# Patient Record
Sex: MALE | Race: WHITE | NOT HISPANIC OR LATINO | URBAN - METROPOLITAN AREA
[De-identification: names, ages, dates, MRNs, and addresses within clinical notes are randomized per-mention and may not be internally consistent; named-entity substitution may affect disease eponyms.]

---

## 2018-07-27 PROBLEM — Z00.00 ENCOUNTER FOR PREVENTIVE HEALTH EXAMINATION: Status: ACTIVE | Noted: 2018-07-27

## 2018-07-28 ENCOUNTER — OUTPATIENT (OUTPATIENT)
Dept: OUTPATIENT SERVICES | Facility: HOSPITAL | Age: 83
LOS: 1 days | End: 2018-07-28
Payer: MEDICARE

## 2018-07-28 ENCOUNTER — APPOINTMENT (OUTPATIENT)
Dept: MRI IMAGING | Facility: IMAGING CENTER | Age: 83
End: 2018-07-28
Payer: MEDICARE

## 2018-07-28 DIAGNOSIS — Z00.8 ENCOUNTER FOR OTHER GENERAL EXAMINATION: ICD-10-CM

## 2018-07-28 PROCEDURE — A9585: CPT

## 2018-07-28 PROCEDURE — 70551 MRI BRAIN STEM W/O DYE: CPT

## 2018-07-28 PROCEDURE — 70549 MR ANGIOGRAPH NECK W/O&W/DYE: CPT

## 2018-07-28 PROCEDURE — 70551 MRI BRAIN STEM W/O DYE: CPT | Mod: 26

## 2018-07-28 PROCEDURE — 70549 MR ANGIOGRAPH NECK W/O&W/DYE: CPT | Mod: 26

## 2018-07-28 PROCEDURE — 82565 ASSAY OF CREATININE: CPT

## 2018-08-06 ENCOUNTER — APPOINTMENT (OUTPATIENT)
Dept: MRI IMAGING | Facility: IMAGING CENTER | Age: 83
End: 2018-08-06

## 2023-09-19 ENCOUNTER — INPATIENT (INPATIENT)
Facility: HOSPITAL | Age: 88
LOS: 5 days | Discharge: HOME HEALTH | End: 2023-09-25
Attending: HOSPITALIST | Admitting: HOSPITALIST
Payer: MEDICARE

## 2023-09-19 VITALS
OXYGEN SATURATION: 93 % | WEIGHT: 165.79 LBS | HEART RATE: 82 BPM | SYSTOLIC BLOOD PRESSURE: 125 MMHG | RESPIRATION RATE: 18 BRPM | DIASTOLIC BLOOD PRESSURE: 71 MMHG

## 2023-09-19 DIAGNOSIS — I48.91 UNSPECIFIED ATRIAL FIBRILLATION: ICD-10-CM

## 2023-09-19 DIAGNOSIS — R79.89 OTHER SPECIFIED ABNORMAL FINDINGS OF BLOOD CHEMISTRY: ICD-10-CM

## 2023-09-19 DIAGNOSIS — B34.9 VIRAL INFECTION, UNSPECIFIED: ICD-10-CM

## 2023-09-19 DIAGNOSIS — Z95.1 PRESENCE OF AORTOCORONARY BYPASS GRAFT: Chronic | ICD-10-CM

## 2023-09-19 DIAGNOSIS — R77.8 OTHER SPECIFIED ABNORMALITIES OF PLASMA PROTEINS: ICD-10-CM

## 2023-09-19 DIAGNOSIS — R73.03 PREDIABETES: ICD-10-CM

## 2023-09-19 DIAGNOSIS — G20 PARKINSON'S DISEASE: ICD-10-CM

## 2023-09-19 DIAGNOSIS — M62.82 RHABDOMYOLYSIS: ICD-10-CM

## 2023-09-19 DIAGNOSIS — U07.1 COVID-19: ICD-10-CM

## 2023-09-19 DIAGNOSIS — I10 ESSENTIAL (PRIMARY) HYPERTENSION: ICD-10-CM

## 2023-09-19 DIAGNOSIS — Z98.890 OTHER SPECIFIED POSTPROCEDURAL STATES: Chronic | ICD-10-CM

## 2023-09-19 DIAGNOSIS — D32.9 BENIGN NEOPLASM OF MENINGES, UNSPECIFIED: ICD-10-CM

## 2023-09-19 DIAGNOSIS — Z29.9 ENCOUNTER FOR PROPHYLACTIC MEASURES, UNSPECIFIED: ICD-10-CM

## 2023-09-19 DIAGNOSIS — N40.0 BENIGN PROSTATIC HYPERPLASIA WITHOUT LOWER URINARY TRACT SYMPTOMS: ICD-10-CM

## 2023-09-19 LAB
ALBUMIN SERPL ELPH-MCNC: 3.9 G/DL — SIGNIFICANT CHANGE UP (ref 3.3–5)
ALP SERPL-CCNC: 57 U/L — SIGNIFICANT CHANGE UP (ref 40–120)
ALT FLD-CCNC: 30 U/L — SIGNIFICANT CHANGE UP (ref 4–41)
ANION GAP SERPL CALC-SCNC: 14 MMOL/L — SIGNIFICANT CHANGE UP (ref 7–14)
APTT BLD: 35.1 SEC — SIGNIFICANT CHANGE UP (ref 24.5–35.6)
AST SERPL-CCNC: 59 U/L — HIGH (ref 4–40)
BASOPHILS # BLD AUTO: 0.02 K/UL — SIGNIFICANT CHANGE UP (ref 0–0.2)
BASOPHILS NFR BLD AUTO: 0.3 % — SIGNIFICANT CHANGE UP (ref 0–2)
BILIRUB SERPL-MCNC: 0.5 MG/DL — SIGNIFICANT CHANGE UP (ref 0.2–1.2)
BUN SERPL-MCNC: 23 MG/DL — SIGNIFICANT CHANGE UP (ref 7–23)
CALCIUM SERPL-MCNC: 8.7 MG/DL — SIGNIFICANT CHANGE UP (ref 8.4–10.5)
CHLORIDE SERPL-SCNC: 101 MMOL/L — SIGNIFICANT CHANGE UP (ref 98–107)
CK MB BLD-MCNC: 0.3 % — SIGNIFICANT CHANGE UP (ref 0–2.5)
CK MB CFR SERPL CALC: 5.4 NG/ML — SIGNIFICANT CHANGE UP
CK SERPL-CCNC: 1670 U/L — HIGH (ref 30–200)
CO2 SERPL-SCNC: 24 MMOL/L — SIGNIFICANT CHANGE UP (ref 22–31)
CREAT SERPL-MCNC: 1.35 MG/DL — HIGH (ref 0.5–1.3)
EGFR: 50 ML/MIN/1.73M2 — LOW
EOSINOPHIL # BLD AUTO: 0.17 K/UL — SIGNIFICANT CHANGE UP (ref 0–0.5)
EOSINOPHIL NFR BLD AUTO: 2.3 % — SIGNIFICANT CHANGE UP (ref 0–6)
FLUAV AG NPH QL: SIGNIFICANT CHANGE UP
FLUBV AG NPH QL: SIGNIFICANT CHANGE UP
GLUCOSE SERPL-MCNC: 144 MG/DL — HIGH (ref 70–99)
HCT VFR BLD CALC: 39.9 % — SIGNIFICANT CHANGE UP (ref 39–50)
HGB BLD-MCNC: 13.1 G/DL — SIGNIFICANT CHANGE UP (ref 13–17)
IANC: 4.87 K/UL — SIGNIFICANT CHANGE UP (ref 1.8–7.4)
IMM GRANULOCYTES NFR BLD AUTO: 1.1 % — HIGH (ref 0–0.9)
LYMPHOCYTES # BLD AUTO: 1.36 K/UL — SIGNIFICANT CHANGE UP (ref 1–3.3)
LYMPHOCYTES # BLD AUTO: 18 % — SIGNIFICANT CHANGE UP (ref 13–44)
MAGNESIUM SERPL-MCNC: 2.2 MG/DL — SIGNIFICANT CHANGE UP (ref 1.6–2.6)
MCHC RBC-ENTMCNC: 30.4 PG — SIGNIFICANT CHANGE UP (ref 27–34)
MCHC RBC-ENTMCNC: 32.8 GM/DL — SIGNIFICANT CHANGE UP (ref 32–36)
MCV RBC AUTO: 92.6 FL — SIGNIFICANT CHANGE UP (ref 80–100)
MONOCYTES # BLD AUTO: 1.04 K/UL — HIGH (ref 0–0.9)
MONOCYTES NFR BLD AUTO: 13.8 % — SIGNIFICANT CHANGE UP (ref 2–14)
NEUTROPHILS # BLD AUTO: 4.87 K/UL — SIGNIFICANT CHANGE UP (ref 1.8–7.4)
NEUTROPHILS NFR BLD AUTO: 64.5 % — SIGNIFICANT CHANGE UP (ref 43–77)
NRBC # BLD: 0 /100 WBCS — SIGNIFICANT CHANGE UP (ref 0–0)
NRBC # FLD: 0 K/UL — SIGNIFICANT CHANGE UP (ref 0–0)
PHOSPHATE SERPL-MCNC: 3.6 MG/DL — SIGNIFICANT CHANGE UP (ref 2.5–4.5)
PLATELET # BLD AUTO: 201 K/UL — SIGNIFICANT CHANGE UP (ref 150–400)
POTASSIUM SERPL-MCNC: 3.9 MMOL/L — SIGNIFICANT CHANGE UP (ref 3.5–5.3)
POTASSIUM SERPL-SCNC: 3.9 MMOL/L — SIGNIFICANT CHANGE UP (ref 3.5–5.3)
PROT SERPL-MCNC: 6.7 G/DL — SIGNIFICANT CHANGE UP (ref 6–8.3)
RBC # BLD: 4.31 M/UL — SIGNIFICANT CHANGE UP (ref 4.2–5.8)
RBC # FLD: 14.1 % — SIGNIFICANT CHANGE UP (ref 10.3–14.5)
RSV RNA NPH QL NAA+NON-PROBE: SIGNIFICANT CHANGE UP
SARS-COV-2 RNA SPEC QL NAA+PROBE: DETECTED
SODIUM SERPL-SCNC: 139 MMOL/L — SIGNIFICANT CHANGE UP (ref 135–145)
TROPONIN T, HIGH SENSITIVITY RESULT: 132 NG/L — CRITICAL HIGH
TROPONIN T, HIGH SENSITIVITY RESULT: 133 NG/L — CRITICAL HIGH
TSH SERPL-MCNC: 0.65 UIU/ML — SIGNIFICANT CHANGE UP (ref 0.27–4.2)
WBC # BLD: 7.54 K/UL — SIGNIFICANT CHANGE UP (ref 3.8–10.5)
WBC # FLD AUTO: 7.54 K/UL — SIGNIFICANT CHANGE UP (ref 3.8–10.5)

## 2023-09-19 PROCEDURE — 99285 EMERGENCY DEPT VISIT HI MDM: CPT

## 2023-09-19 PROCEDURE — 99223 1ST HOSP IP/OBS HIGH 75: CPT | Mod: GC

## 2023-09-19 PROCEDURE — 71045 X-RAY EXAM CHEST 1 VIEW: CPT | Mod: 26

## 2023-09-19 PROCEDURE — 70450 CT HEAD/BRAIN W/O DYE: CPT | Mod: 26,MA

## 2023-09-19 RX ORDER — REMDESIVIR 5 MG/ML
100 INJECTION INTRAVENOUS EVERY 24 HOURS
Refills: 0 | Status: COMPLETED | OUTPATIENT
Start: 2023-09-20 | End: 2023-09-21

## 2023-09-19 RX ORDER — HEPARIN SODIUM 5000 [USP'U]/ML
INJECTION INTRAVENOUS; SUBCUTANEOUS
Qty: 25000 | Refills: 0 | Status: DISCONTINUED | OUTPATIENT
Start: 2023-09-19 | End: 2023-09-19

## 2023-09-19 RX ORDER — REMDESIVIR 5 MG/ML
200 INJECTION INTRAVENOUS EVERY 24 HOURS
Refills: 0 | Status: COMPLETED | OUTPATIENT
Start: 2023-09-19 | End: 2023-09-19

## 2023-09-19 RX ORDER — MONTELUKAST 4 MG/1
1 TABLET, CHEWABLE ORAL
Refills: 0 | DISCHARGE

## 2023-09-19 RX ORDER — HEPARIN SODIUM 5000 [USP'U]/ML
13 INJECTION INTRAVENOUS; SUBCUTANEOUS
Qty: 25000 | Refills: 0 | Status: DISCONTINUED | OUTPATIENT
Start: 2023-09-19 | End: 2023-09-19

## 2023-09-19 RX ORDER — SODIUM CHLORIDE 9 MG/ML
1000 INJECTION INTRAMUSCULAR; INTRAVENOUS; SUBCUTANEOUS
Refills: 0 | Status: DISCONTINUED | OUTPATIENT
Start: 2023-09-19 | End: 2023-09-20

## 2023-09-19 RX ORDER — CLOPIDOGREL BISULFATE 75 MG/1
75 TABLET, FILM COATED ORAL DAILY
Refills: 0 | Status: DISCONTINUED | OUTPATIENT
Start: 2023-09-20 | End: 2023-09-25

## 2023-09-19 RX ORDER — HEPARIN SODIUM 5000 [USP'U]/ML
1300 INJECTION INTRAVENOUS; SUBCUTANEOUS
Qty: 25000 | Refills: 0 | Status: DISCONTINUED | OUTPATIENT
Start: 2023-09-19 | End: 2023-09-20

## 2023-09-19 RX ORDER — REMDESIVIR 5 MG/ML
INJECTION INTRAVENOUS
Refills: 0 | Status: COMPLETED | OUTPATIENT
Start: 2023-09-19 | End: 2023-09-21

## 2023-09-19 RX ORDER — TAMSULOSIN HYDROCHLORIDE 0.4 MG/1
0.4 CAPSULE ORAL AT BEDTIME
Refills: 0 | Status: DISCONTINUED | OUTPATIENT
Start: 2023-09-19 | End: 2023-09-25

## 2023-09-19 RX ORDER — ATORVASTATIN CALCIUM 80 MG/1
1 TABLET, FILM COATED ORAL
Refills: 0 | DISCHARGE

## 2023-09-19 RX ORDER — HEPARIN SODIUM 5000 [USP'U]/ML
5000 INJECTION INTRAVENOUS; SUBCUTANEOUS EVERY 8 HOURS
Refills: 0 | Status: DISCONTINUED | OUTPATIENT
Start: 2023-09-19 | End: 2023-09-19

## 2023-09-19 RX ORDER — CLOPIDOGREL BISULFATE 75 MG/1
1 TABLET, FILM COATED ORAL
Refills: 0 | DISCHARGE

## 2023-09-19 RX ORDER — ASPIRIN/CALCIUM CARB/MAGNESIUM 324 MG
81 TABLET ORAL DAILY
Refills: 0 | Status: DISCONTINUED | OUTPATIENT
Start: 2023-09-20 | End: 2023-09-20

## 2023-09-19 RX ADMIN — REMDESIVIR 200 MILLIGRAM(S): 5 INJECTION INTRAVENOUS at 23:32

## 2023-09-19 RX ADMIN — HEPARIN SODIUM 13 UNIT(S)/HR: 5000 INJECTION INTRAVENOUS; SUBCUTANEOUS at 22:02

## 2023-09-19 RX ADMIN — SODIUM CHLORIDE 75 MILLILITER(S): 9 INJECTION INTRAMUSCULAR; INTRAVENOUS; SUBCUTANEOUS at 19:52

## 2023-09-19 NOTE — H&P ADULT - PROBLEM SELECTOR PLAN 7
Pt with pill-rolling tremor, unclear if pt takes meds for this.   - unclear medication hx, will try to obtain colalteral Pt with pill-rolling tremor, unclear if pt takes meds for this.   - unclear medication hx, will try to obtain colalteral and call pharmacy for med list

## 2023-09-19 NOTE — H&P ADULT - PROBLEM SELECTOR PLAN 2
Pt with reportedly cough, sputum, generalized fatigue w/ weakness. Likely viral syndrome ico COVID-19+. Pt in no resp distress & not hypoxic. Pt with reportedly cough, sputum, sore throat, generalized weakness. Likely viral syndrome ico COVID-19+. Pt in no resp distress & not hypoxic.  - Start remdesivir 3 day course

## 2023-09-19 NOTE — MEDICAL STUDENT ADULT H&P (EDUCATION) - NSHPREVIEWOFSYSTEMS_GEN_ALL_CORE
Denies (wife) chest pain, SOB, palpitations, syncope, bloody/dark stools, recent falls, nausea/vomiting
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
## 2023-09-19 NOTE — H&P ADULT - ATTENDING COMMENTS
Patient seen and examined with team in ed  Agree with above a/p by Dr Milian  89 yo M with h/o HTN, DM, CVA, CAD s/p Cabg, Parkinson p/w weakness to ED.   He was Diagnosed with covid 9/18 at urgent care  c/o 2 days of sore throat/ chills/ cough and white phegm  Slid out of chair 9/18. Patient seen in Ed with wife, noted they had PE 2 weeks ago. no irregular heart beats then  PE NAD   Vital Signs Last 24 Hrs  T(F): 99.3HR: 80 BP: 138/64 RR: 18 SpO2: 99%  room air  Lungs cta, cor irreg, irreg, abd soft n/t, ext neg e/c/c. Neueo- CN 11-xii intact. no focal deficit. Strenth 5 plus b/l                        13.1   7.54  )-----------( 201      ( 19 Sep 2023 11:00 )             39.9   09-19    139  |  101  |  23  ----------------------------<  144<H>  3.9   |  24  |  1.35<H>  Elevated HStrop and CPK 1485--> 1670 with NL ckbm.    ad< from: CT Head No Cont (09.19.23 @ 11:51) >    rd< from: CT Head No Cont (09.19.23 @ 11:51) >  IMPRESSION:  No acute intracranial hemorrhage, brain edema, or mass effect.  No displaced calvarial fracture. Chronic left parietal infarct.  1.2 x 0.7 cm calcified extra-axial lesion in the left lateral frontal  region, likely a meningioma.    rd< from: Xray Chest 1 View- PORTABLE-Urgent (Xray Chest 1 View- PORTABLE-Urgent .) (09.19.23 @ 15:14) >  IMPRESSION:  Clear lungs. No acute traumatic findings.    EKF. Vent resp 84 bpm, Afib, RBBB, Left ? block  A/P  # Afib, new . Monitor on tele. Tsh. Echo. LUANN  Already rate controlled. Start heparin drip w/o bolus. keep ptt 60 to 80 bpm. Cardiology eval.   ?? Need PPM   # Covid positive, Sat nl . no need for steroids. Remdesivir x 3 days. Airbron isolation and contact isolation.  # Creat 1,35. IVF Ns at 50 cc/he x 10 hrs then IVL  # DM fs with Ss coverage. Monitor Hgb aic AM  # Neuro- small meningioma on Ct of head. Observe.  CVA, old stroke seen on CT. observe. Elevated benjie vasc score 7.5. May need DOAC on discharge  # dvt proph- hep drip.  plan d/w patient/ wife/ team. Patient seen and examined with team in ed  Agree with above a/p by Dr Milian  87 yo M with h/o HTN, DM, CVA, CAD s/p Cabg, Parkinson p/w weakness to ED.   He was Diagnosed with covid 9/18 at urgent care  c/o 2 days of sore throat/ chills/ cough and white phegm  Slid out of chair 9/18. Patient seen in Ed with wife, noted they had PE 2 weeks ago. no irregular heart beats then  PE NAD   Vital Signs Last 24 Hrs  T(F): 99.3HR: 80 BP: 138/64 RR: 18 SpO2: 99%  room air  Lungs cta, cor irreg, irreg, abd soft n/t, ext neg e/c/c. Neueo- CN 11-xii intact. no focal deficit. Strenth 5 plus b/l                        13.1   7.54  )-----------( 201      ( 19 Sep 2023 11:00 )             39.9   09-19    139  |  101  |  23  ----------------------------<  144<H>  3.9   |  24  |  1.35<H>  Elevated HStrop and CPK 1485--> 1670 with NL ckbm.    ad< from: CT Head No Cont (09.19.23 @ 11:51) >    rd< from: CT Head No Cont (09.19.23 @ 11:51) >  IMPRESSION:  No acute intracranial hemorrhage, brain edema, or mass effect.  No displaced calvarial fracture. Chronic left parietal infarct.  1.2 x 0.7 cm calcified extra-axial lesion in the left lateral frontal  region, likely a meningioma.    rd< from: Xray Chest 1 View- PORTABLE-Urgent (Xray Chest 1 View- PORTABLE-Urgent .) (09.19.23 @ 15:14) >  IMPRESSION:  Clear lungs. No acute traumatic findings.    EKF. Vent resp 84 bpm, Afib, RBBB, Left ? block  A/P  # Afib, new . Monitor on tele. Tsh. Echo. LUANN  Already rate controlled. Start heparin drip w/o bolus. keep ptt 60 to 80 bpm. Cardiology eval.   ?? Need PPM   # Mild Rhabdo from fall. IVF hydration and monitor cpk and creat in AM.  # Covid positive, oxygen Sat nl . no need for steroids. Remdesivir x 3 days. Airbron isolation and contact isolation.  # Creat 1,35. IVF Ns at 50 cc/hr x 10 hrs then IVL. f/u creat and cpk in AM.  # DM fs with Ss coverage. Monitor Hgb aic AM  # Neuro- small meningioma on Ct of head. Observe.  CVA, old stroke seen on CT. observe. Elevated benjie vasc score 7.5. May need DOAC on discharge  # dvt proph- hep drip.  plan d/w patient/ wife/ team. Patient seen and examined with team in ed  Agree with above a/p by Dr Milian  87 yo M with h/o HTN, DM, CVA, CAD s/p Cabg, Parkinson p/w weakness to ED.   He was Diagnosed with covid 9/18 at urgent care  c/o 2 days of sore throat/ chills/ cough and white phegm  Slid out of chair 9/18. Patient seen in Ed with wife, noted they had PE 2 weeks ago. no irregular heart beats then  PE NAD   Vital Signs Last 24 Hrs  T(F): 99.3HR: 80 BP: 138/64 RR: 18 SpO2: 99%  room air  Lungs cta, cor irreg, irreg, abd soft n/t, ext neg e/c/c. Neuro- CN 11-xii intact. no focal deficit. Strenth 5 plus b/l                        13.1   7.54  )-----------( 201      ( 19 Sep 2023 11:00 )             39.9   09-19    139  |  101  |  23  ----------------------------<  144<H>  3.9   |  24  |  1.35<H>  Elevated HStrop and CPK 1485--> 1670 with NL ckbm.    rd< from: CT Head No Cont (09.19.23 @ 11:51) >  IMPRESSION:  No acute intracranial hemorrhage, brain edema, or mass effect.  No displaced calvarial fracture. Chronic left parietal infarct.  1.2 x 0.7 cm calcified extra-axial lesion in the left lateral frontal  region, likely a meningioma.    rd< from: Xray Chest 1 View- PORTABLE-Urgent (Xray Chest 1 View- PORTABLE-Urgent .) (09.19.23 @ 15:14) >  IMPRESSION:  Clear lungs. No acute traumatic findings.    EKF. Vent resp 84 bpm, Afib, RBBB, Left ? block  A/P  # Afib, new . Monitor on tele. Tsh. Echo. LUANN  Already rate controlled. Start heparin drip w/o bolus. keep ptt 60 to 80 bpm. Cardiology eval.   ?? Need PPM   # Mild Rhabdo from fall. IVF hydration and monitor cpk and creat in AM.  # Covid positive, oxygen Sat nl . no need for steroids. Remdesivir x 3 days. Airbron isolation and contact isolation.  # Creat 1,35. IVF Ns at 50 cc/hr x 10 hrs then IVL. f/u creat and cpk in AM.  # DM fs with Ss coverage. Monitor Hgb aic AM  # Neuro- small meningioma on Ct of head. Observe.  CVA, old stroke seen on CT. observe. Elevated benjie vasc score 7.5. May need DOAC on discharge  # dvt proph- hep drip.  plan d/w patient/ wife/ team.

## 2023-09-19 NOTE — MEDICAL STUDENT ADULT H&P (EDUCATION) - NS MD HP STUD SUPERVISOR COMMENTS FT
Reviewed as above, italices / brackets to provide feedback, but this is meant to be educational only. Please refer for formal resident/attending H&P from same day.

## 2023-09-19 NOTE — CHART NOTE - NSCHARTNOTEFT_GEN_A_CORE
Patient seen and examined with team in ed  Agree with above a/p by Dr Milian  89 yo M with h/o HTN, DM, CVA, CAD s/p Cabg, Parkinson p/w weakness to ED.   He was Diagnosed with covid 9/18 at urgent care  c/o 2 days of sore throat/ chills/ cough and white phegm  Slid out of chair 9/18. Patient seen in Ed with wife, noted they had PE 2 weeks ago. no irregular heart beats then  PE NAD   Vital Signs Last 24 Hrs  T(F): 99.3HR: 80 BP: 138/64 RR: 18 SpO2: 99%  room air  Lungs cta, cor irreg, irreg, abd soft n/t, ext neg e/c/c. Neueo- CN 11-xii intact. no focal deficit. Strenth 5 plus b/l                        13.1   7.54  )-----------( 201      ( 19 Sep 2023 11:00 )             39.9   09-19    139  |  101  |  23  ----------------------------<  144<H>  3.9   |  24  |  1.35<H>    ad< from: CT Head No Cont (09.19.23 @ 11:51) >    rd< from: CT Head No Cont (09.19.23 @ 11:51) >  IMPRESSION:  No acute intracranial hemorrhage, brain edema, or mass effect.  No displaced calvarial fracture. Chronic left parietal infarct.  1.2 x 0.7 cm calcified extra-axial lesion in the left lateral frontal  region, likely a meningioma.    rd< from: Xray Chest 1 View- PORTABLE-Urgent (Xray Chest 1 View- PORTABLE-Urgent .) (09.19.23 @ 15:14) >  IMPRESSION:  Clear lungs. No acute traumatic findings.    EKF. Vent resp 84 bpm, Afib, RBBB, Left ? block  A/P  # Afib, new . Monitor on tele. Tsh. Echo.  Already rate controlled. Start heparin drip w/o bolus. keep ptt 60 to 80 bpm. Cardiology eval.   ?? Need PPM   # Covid positive, Sat nl . no need for steroids. Remdesivir x 3 days. Airborn isolation and contact isolation.  # Creat 1,35. IVF Ns at 50 cc/he x 10 hrs then IVL  # DM fs with Ss coverage. Monitor Hgb aic AM  # Neuro- small menigioma on Ct of head. Observe.  CVA, old stroke seen on CT. observe. Elevated chadvasc score 7.5. May need DOAC on discharge  # dvt proph- hep drip.  plan d/w patient/ wife/ team.

## 2023-09-19 NOTE — H&P ADULT - NSHPREVIEWOFSYSTEMS_GEN_ALL_CORE
GEN: No fever, chills, night sweats, weight loss  EYES: No vision changes, irritation, itchiness  ENT: No ear pain, congestion, sore throat  RESP: No cough or trouble breathing  CARDIOVASCULAR: No chest pain or palpitations  GI: No nausea/vomiting, diarrhea, constipation  :  No change in urine output; no dysuria, hematuria, or discharge  MSK: No joint or muscle pain  SKIN: No rashes  NEURO: No headache; no abnormal movements; no numbness or tingling  Remainder negative, except as per the HPI GEN: SUBJECTIVE FEVER, CHILLS no night sweats, weight loss  EYES: No vision changes, irritation, itchiness  ENT: SORE THROAT, No ear pain, congestion  RESP: COUGH, no trouble breathing  CARDIOVASCULAR: No chest pain or palpitations  GI: NAUSEA. no vomiting, diarrhea, constipation  :  No change in urine output; no dysuria, hematuria, or discharge  MSK: No joint or muscle pain  SKIN: No rashes  NEURO: No headache; no abnormal movements; no numbness or tingling  Remainder negative, except as per the HPI

## 2023-09-19 NOTE — H&P ADULT - PROBLEM SELECTOR PLAN 6
Pt with incidentally noted calcified lesion in anterior/frontal cranium, likely meningioma. No focal neuro deficits.  - Will report in discharge summary for OP follow-up  - No urgent intervention required Incidentally noted calcified lesion in anterior/frontal cranium, likely meningioma. No focal neuro deficits.  - Will report in discharge summary for OP follow-up  - No urgent intervention required

## 2023-09-19 NOTE — ED ADULT NURSE REASSESSMENT NOTE - NS ED NURSE REASSESS COMMENT FT1
Pt is A&XO4. Respirations are even and unlabored on room air. Pt on cardiac monitor. At this time pt denies pain/discomfort. Pt denies chest pain, SOB, abdominal pain, N/V, headache, blurry vision. Pt is admitted-awaiting bed assignment.

## 2023-09-19 NOTE — ED ADULT NURSE NOTE - NSFALLRISKINTERV_ED_ALL_ED

## 2023-09-19 NOTE — H&P ADULT - PROBLEM SELECTOR PLAN 1
Unclear chronicity, PCP says never documented, but pt also with poor adherence to recommended OP follow-up. Pt's weakness appears more related to strength of muscles as opposed to dyspnea/lightheadedness, but pt did have some of hthis .Unclear if trigger is related to COVID or underlying cardiac disease. Currently rate-controlled w/o medications.  Diagnostics  - TTE  - Tele monitoring  - Cards C/S, Appreciate Recs    Therapeutics  - Heparin gtt (no bolus) Unclear chronicity, PCP says never documented, but pt also with poor adherence to recommended OP follow-up. Pt's weakness appears more related to strength of muscles as opposed to dyspnea/lightheadedness, but pt did have some of dizziness. Unclear if trigger related to COVID vs underlying cardiac disease. Currently rate-controlled w/o AC  Diagnostics  - TTE  - Tele monitoring  - Cards C/S in AM, Appreciate Recs    Therapeutics  - Heparin gtt (no bolus): goal aPPT 60-80

## 2023-09-19 NOTE — ED PROVIDER NOTE - PROGRESS NOTE DETAILS
ALFONSO Villalta: Spoke with pts PMD Dr.Marendra Pena [phone], states pt is not compliant does not see him frequently, has no documented hx of afib. ALFONSO Villalta: Spoke with hospitalist who accepts admission for new onset afib, elevated trop, weakness and covid. Pt aware of admission
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
## 2023-09-19 NOTE — MEDICAL STUDENT ADULT H&P (EDUCATION) - PLAN 3
Continue home regimen but d/c ACE inhibitors or ARBs SBP goal < 140 based on patient's age. Currently at goal, unclear home meds.   - Continue home regimen but d/c ACE inhibitors or ARBs

## 2023-09-19 NOTE — ED PROVIDER NOTE - CARE PLAN
Principal Discharge DX:	New onset atrial fibrillation  Secondary Diagnosis:	COVID  Secondary Diagnosis:	Weakness   1
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
## 2023-09-19 NOTE — H&P ADULT - PROBLEM SELECTOR PLAN 3
Mahamed naylorevkiana, flat. ECG non-ishemic. Unclear if related to elevated creatinine or supply-demand mismatch ico COVID-19 & ?New afib.   - Trop Tro elevated but flat. ECG non-ishemic (no prior to compare). Unclear if related to elevated creatinine vs supply-demand mismatch ico COVID-19 & ?New afib.   - Trop 132->133

## 2023-09-19 NOTE — H&P ADULT - NSHPPHYSICALEXAM_GEN_ALL_CORE
GEN: Awake, AOx3, NAD.  HEENT: NCAT  ---EYES: no scleral icterus, EOMI, PERRLA  CARDIO: RRR. Normal S1/S2, no m/r/g. No JVD.  RESP: CTAB, no w/r/r; distant  ABD: Soft, NTND.   MSK: No obvious deformity or ROM deficit.   SKIN: Warm, dry.  NEURO: Moves all four extremities spontaneously; +pill-rolling tremor b/l (L > R)  -- 5/5 strenght throughout, though difficulty lifting body weight from bed  -- allowing for pill-rolling tremor, intact finger-to-nose b/l  PSYCH: Appropriate mood & affect.

## 2023-09-19 NOTE — ED ADULT NURSE REASSESSMENT NOTE - NS ED NURSE REASSESS COMMENT FT1
Pt is alert and responsive ambulatory with assistance. Pt on room air-respirations are even and unlabored. Pt admitted-transport presented. Pt is stable. Pt ambulated to wheelchair with assistance for transport upstairs. Pt is alert and responsive ambulatory with assistance. Pt on room air-respirations are even and unlabored. IV access in place. Pt admitted, report was given by previous covering RN. Pt is stable. Pt ambulated to wheelchair with assistance for transport upstairs. Transport left unit with patient.

## 2023-09-19 NOTE — H&P ADULT - NSICDXFAMILYHX_GEN_ALL_CORE_FT
FAMILY HISTORY:  Father  Still living? Unknown  FH: myocardial infarction, Age at diagnosis: Age Unknown    Sibling  Still living? No  FH: myocardial infarction, Age at diagnosis: Age Unknown

## 2023-09-19 NOTE — H&P ADULT - PROBLEM SELECTOR PLAN 8
Pt with CK > 1.5k. Will start gentle IVF given unclear cardiac function & worsening rhabdo  - Hold statins Pt with CK > 1.5k. Will start gentle IVF given unclear cardiac function & worsening rhabdo  - Hold statins   - start 50cc/hr NS

## 2023-09-19 NOTE — ED PROVIDER NOTE - NSICDXPASTMEDICALHX_GEN_ALL_CORE_FT
PAST MEDICAL HISTORY:  BPH (benign prostatic hyperplasia)     CVA (cerebrovascular accident)     HTN (hypertension)     Parkinsons     S/P CABG (coronary artery bypass graft)
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
## 2023-09-19 NOTE — H&P ADULT - PROBLEM SELECTOR PLAN 10
Pt with elevated creatinine to 1.35. Unclear if FUAD or age-related elevated creatinine.  No significant electrolyte abnormalities or c/f alarming CKD that would require more urgent intervention.  - Trend BMPs  - avoid nephrotoxic agents

## 2023-09-19 NOTE — MEDICAL STUDENT ADULT H&P (EDUCATION) - NSHPPHYSICALEXAM_GEN_ALL_CORE
Vitals: slightly elevated (80 bpm), /64, RR 18, 99% on room air    Patient is alert and oriented x4  General - well appearing, cooperative, NAD, nontoxic  Heart - not examined  Lungs - not examined  Extremities - no peripheral edema  Neuro - motor function grossly normal, patient can ambulate, no dysmetria, intention tremor GEN: Awake, AOx3, NAD.  HEENT: NCAT  ---EYES: no scleral icterus, EOMI, PERRLA  CARDIO: RRR. Normal S1/S2, no m/r/g. No JVD.  RESP: CTAB, no w/r/r  ABD: Soft, NTND.   MSK: No obvious deformity or ROM deficit. 2+ pulses x4. No edema.  SKIN: Warm, dry. No rashes. Nail beds without cyanosis or clubbing.  NEURO: Moves all four extremities spontaneously  PSYCH: Appropriate mood & affect. No VH/AH. No SI/HI.

## 2023-09-19 NOTE — H&P ADULT - NSHPSOCIALHISTORY_GEN_ALL_CORE
Lives: primarily in [facility]; here trying to seel apt in [facility]    Tobacco Use: never used    EtOH Use: none    Other Substances: never used Lives: primarily in [facility]; here trying to sell apt in [facility]    Tobacco Use: never used    EtOH Use: none    Other Substances: never used

## 2023-09-19 NOTE — H&P ADULT - NSICDXPASTMEDICALHX_GEN_ALL_CORE_FT
PAST MEDICAL HISTORY:  BPH (benign prostatic hyperplasia)     CAD (coronary artery disease)     CVA (cerebrovascular accident)     HTN (hypertension)     Parkinsons     Pre-diabetes
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
## 2023-09-19 NOTE — ED PROVIDER NOTE - NS ED ATTENDING STATEMENT MOD
Attending with This was a shared visit with the [facility]. I reviewed and verified the documentation and independently performed the documented:

## 2023-09-19 NOTE — H&P ADULT - PROBLEM SELECTOR PLAN 5
Unclear diabetic history. No a1c in system  - A1c check  - CC diet  - May need basal/bolus, but would await A1c Unclear diabetic history. No a1c in system  - A1c check  - Start consistent carb diet  - May need basal/bolus, but would await A1c

## 2023-09-19 NOTE — MEDICAL STUDENT ADULT H&P (EDUCATION) - PLAN 1
Based on a CHADSVASC score of 7, anticoagulation should be initiated - heparin drip in addition to aspirin and plavix. Cardiology should be consulted due to the patient's cardiac comorbidities and new-onset atrial fibrillation before beta-blockers are administered for rate control. An echo should be obtained to rule out any structural causes of atrial fibrillation. Repeat EKGs, CBC, BMP, coags (PT/PTT/fibrinogen) should be done daily to assess treatment success. [Pt asymptomatic, hemodynamically stable. Not in RVR. Trigger likely related to either COVID-19 infection or cardiac pathology - unclear based on history.]     Based on a CHADSVASC score of 7, anticoagulation should be initiated - heparin drip in addition to aspirin and plavix. Cardiology should be consulted due to the patient's cardiac comorbidities and new-onset atrial fibrillation before beta-blockers are administered for rate control. An echo should be obtained to rule out any structural causes of atrial fibrillation. Repeat EKGs, CBC, BMP, coags (PT/PTT/fibrinogen) should be done daily to assess treatment success.
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
## 2023-09-19 NOTE — MEDICAL STUDENT ADULT H&P (EDUCATION) - NSHPSOCIALHISTORY_GEN_ALL_CORE
to his wife for decades  Lives in [place], came back to [place] to sell their property on [address]  Does not need assistance with daily activities  Regularly takes medications as appropriate  The patient's wife states that they had a full workup and physical done two weeks ago by their PCP with no new concerns
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
## 2023-09-19 NOTE — PATIENT PROFILE ADULT - FALL HARM RISK - HARM RISK INTERVENTIONS
Assistance with ambulation/Assistance OOB with selected safe patient handling equipment/Communicate Risk of Fall with Harm to all staff/Monitor for mental status changes/Monitor gait and stability/Reinforce activity limits and safety measures with patient and family/Reorient to person, place and time as needed/Review medications for side effects contributing to fall risk/Sit up slowly, dangle for a short time, stand at bedside before walking/Tailored Fall Risk Interventions/Toileting schedule using arm’s reach rule for commode and bathroom/Use of alarms - bed, chair and/or voice tab/Visual Cue: Yellow wristband and red socks/Bed in lowest position, wheels locked, appropriate side rails in place/Call bell, personal items and telephone in reach/Instruct patient to call for assistance before getting out of bed or chair/Non-slip footwear when patient is out of bed/[place] to call system/Physically safe environment - no spills, clutter or unnecessary equipment/Purposeful Proactive Rounding/Room/bathroom lighting operational, light cord in reach

## 2023-09-19 NOTE — H&P ADULT - NSHPLABSRESULTS_GEN_ALL_CORE
LABORATORY DATA                        13.1   7.54  )-----------( 201      ( 19 Sep 2023 11:00 )             39.9     09-19    139  |  101  |  23  ----------------------------<  144<H>  3.9   |  24  |  1.35<H>    Ca    8.7      19 Sep 2023 11:00  Phos  3.6     09-19  Mg     2.20     09-19    TPro  6.7  /  Alb  3.9  /  TBili  0.5  /  DBili  x   /  AST  59<H>  /  ALT  30  /  AlkPhos  57  09-19      CARDIAC MARKERS ( 19 Sep 2023 13:37 )  x     / x     / 1670 U/L / x     / 5.4 ng/mL  CARDIAC MARKERS ( 19 Sep 2023 11:00 )  x     / x     / 1485 U/L / x     / 5.4 ng/mL      Urinalysis Basic - ( 19 Sep 2023 11:00 )    Color: x / Appearance: x / SG: x / pH: x  Gluc: 144 mg/dL / Ketone: x  / Bili: x / Urobili: x   Blood: x / Protein: x / Nitrite: x   Leuk Esterase: x / RBC: x / WBC x   Sq Epi: x / Non Sq Epi: x / Bacteria: x    CAPILLARY BLOOD GLUCOSE    IMAGING REVIEW  < from: CT Head No Cont (09.19.23 @ 11:51) >    FINDINGS:    No hydrocephalus, mass effect, midline shift, acute intracranial   hemorrhage, or brain edema.    Chronic left parietal infarct. Mild white matter microvascular ischemic   disease.    1.2 x 0.7 cm calcified extra-axial lesion in the left lateral frontal   region, likely a meningioma.No subjacent edema.    Intraorbital contents unremarkable.    No displaced calvarial fracture.    Aerated secretions in the right maxillary sinus, minimal right maxillary   sinus and callosal thickening, mastoid air cells clear.    IMPRESSION:    No acute intracranial hemorrhage, brain edema, or mass effect.  No displaced calvarial fracture.    Chronic left parietal infarct.    1.2 x 0.7 cm calcified extra-axial lesion in the left lateral frontal   region, likely a meningioma.    < end of copied text >    < from: Xray Chest 1 View- PORTABLE-Urgent (Xray Chest 1 View- PORTABLE-Urgent .) (09.19.23 @ 15:14) >    FINDINGS:    Lungs are free of focal abnormalities.  Median sternotomy wires intact. Mediastinal surgical clips noted.  Mild vascular congestion.  There is no pleural effusion or pneumothorax.  The heart is difficult to evaluate on this view.  The visualized osseous structures demonstrate no acute pathology.    IMPRESSION:  Clear lungs.  No acute traumatic findings.      MICROBIOLOGY REVIEW  COVID+    CARDIOLOGY REVIEW  ECG with AFib, bifascicular block

## 2023-09-19 NOTE — ED ADULT TRIAGE NOTE - GLASGOW COMA SCALE: BE[employer]OR RESPONSE, MLM
Past Medical History:   Diagnosis Date    Alzheimer disease 2012    Cancer     renal cell. right    Dementia     Diabetes mellitus     GERD (gastroesophageal reflux disease)     Hyperlipidemia     Hypothyroid     Memory loss     Movement disorder        Past Surgical History:   Procedure Laterality Date    HYSTERECTOMY      JOINT REPLACEMENT      right    KNEE ARTHROSCOPY W/ DEBRIDEMENT      left    PARTIAL NEPHRECTOMY      right.     TONSILLECTOMY         Review of patient's allergies indicates:   Allergen Reactions    Phenergan [promethazine] Other (See Comments)     hallucinations    Prilosec [omeprazole magnesium] Hives and Rash       No current facility-administered medications on file prior to encounter.      Current Outpatient Medications on File Prior to Encounter   Medication Sig    amLODIPine (NORVASC) 5 MG tablet Take 1 tablet (5 mg total) by mouth once daily.    aspirin 325 MG tablet Take 325 mg by mouth once daily.    CRANBERRY FRUIT EXTRACT (CRANBERRY EXTRACT ORAL) Take 1 capsule by mouth once daily at 6am.    donepezil (ARICEPT) 10 MG tablet Take 1 tablet (10 mg total) by mouth once daily.    enalapril (VASOTEC) 5 MG tablet Take 1 tablet (5 mg total) by mouth once daily.    levothyroxine (SYNTHROID) 100 MCG tablet 1 tab po qod odd days with 88 mcg. Po on even days.    levothyroxine (SYNTHROID) 88 MCG tablet 1 tab po qod even days with 100 mcg. po Tab odd days    metFORMIN (GLUCOPHAGE-XR) 500 MG 24 hr tablet Take 1 tablet (500 mg total) by mouth 2 (two) times daily with meals.    mirtazapine (REMERON) 15 MG tablet Take 1 tablet (15 mg total) by mouth every evening.    MULTIVIT-MINERALS/FERROUS FUM (MULTI VITAMIN ORAL) Take 1 tablet by mouth once daily.    pantoprazole (PROTONIX) 40 MG tablet Take 1 tablet (40 mg total) by mouth once daily.     Family History     Problem Relation (Age of Onset)    Mother:   Father:      Cancer Mother (90)        Tobacco Use     Smoking status: Never Smoker    Smokeless tobacco: Never Used   Substance and Sexual Activity    Alcohol use: No    Drug use: No    Sexual activity: No     Review of Systems   Constitutional: Positive for appetite change and fatigue.   HENT: Negative for congestion.    Eyes: Negative for visual disturbance.   Respiratory: Positive for cough and shortness of breath.    Cardiovascular: Negative for chest pain and palpitations.   Gastrointestinal: Negative for abdominal pain, diarrhea, nausea and vomiting.   Genitourinary: Negative for dysuria, flank pain and hematuria.   Musculoskeletal: Negative for arthralgias.   Skin: Negative for wound.   Neurological: Negative for dizziness and headaches.     Objective:     Vital Signs (Most Recent):  Temp: 99.6 °F (37.6 °C) (02/16/19 0119)  Pulse: (!) 115 (02/16/19 0119)  Resp: 14 (02/16/19 0119)  BP: 138/66 (02/16/19 0119)  SpO2: (!) 93 % (02/16/19 0119) Vital Signs (24h Range):  Temp:  [98.3 °F (36.8 °C)-99.6 °F (37.6 °C)] 99.6 °F (37.6 °C)  Pulse:  [] 115  Resp:  [14-18] 14  SpO2:  [92 %-99 %] 93 %  BP: (137-162)/(65-92) 138/66     Weight: 59 kg (130 lb)  Body mass index is 21.63 kg/m².    Physical Exam   Constitutional: She is oriented to person, place, and time. No distress.   HENT:   Head: Normocephalic.   Eyes: Pupils are equal, round, and reactive to light.   Neck: Normal range of motion. Neck supple. No JVD present. No tracheal deviation present.   Cardiovascular: Regular rhythm, normal heart sounds and intact distal pulses. Tachycardia present.   No murmur heard.   on telemetry   Pulmonary/Chest: Effort normal and breath sounds normal. No respiratory distress.   Abdominal: Soft. Bowel sounds are normal. She exhibits no distension. There is no tenderness.   Neurological: She is alert and oriented to person, place, and time.   Intermittent confusion   Skin: Skin is warm and dry. Capillary refill takes less than 2 seconds.         CRANIAL NERVES     CN  III, IV, VI   Pupils are equal, round, and reactive to light.       Significant Labs:   CBC:   Recent Labs   Lab 02/15/19  1945   WBC 18.10*   HGB 14.3   HCT 43.1        CMP:   Recent Labs   Lab 02/15/19  1945   *   K 4.6   CL 99   CO2 22*   *   BUN 19   CREATININE 1.1   CALCIUM 10.5   PROT 7.4   ALBUMIN 3.3*   BILITOT 1.4*   ALKPHOS 96   AST 12   ALT 10   ANIONGAP 11   EGFRNONAA 48*     Coagulation:   Recent Labs   Lab 02/15/19  1945   INR 1.0     Lactic Acid:   Recent Labs   Lab 02/15/19  2038 02/16/19  0003   LACTATE 2.3* 2.4*     Troponin:   Recent Labs   Lab 02/15/19  1945   TROPONINI 0.011     TSH:   Recent Labs   Lab 02/15/19  1945   TSH 0.897     Urine Studies:   Recent Labs   Lab 02/15/19  1950   COLORU Yellow   APPEARANCEUA Cloudy*   PHUR 6.0   SPECGRAV 1.025   PROTEINUA 1+*   GLUCUA Negative   KETONESU Negative   BILIRUBINUA Negative   OCCULTUA 2+*   NITRITE Positive*   UROBILINOGEN 1.0   LEUKOCYTESUR 2+*   RBCUA 5*   WBCUA >100*   BACTERIA Many*   HYALINECASTS 0     Microbiology Results (last 7 days)     Procedure Component Value Units Date/Time    Blood culture [[id number]] Collected:  02/15/19 2038    Order Status:  Sent Specimen:  Blood from Peripheral, Left  Hand Updated:  02/16/19 0006    Blood culture [[id number]] Collected:  02/15/19 2038    Order Status:  Sent Specimen:  Blood from Peripheral, Right  Hand Updated:  02/16/19 0006    Urine culture [[id number]] Collected:  02/15/19 1950    Order Status:  No result Specimen:  Urine Updated:  02/15/19 2019            Significant Imaging:     CXR: Reviewed film--looks ok.    CT Abd/pelvis w/o Contrast: Reviewed radiologist's report-- 1) Duplicated urinary tract involves left kidney. There appears to be some distention involving the lower pole moiety along with left-sided perinephric stranding. These are nonspecific findings and there is no evidence of urinary tract stone. 2) Urinary bladder wall thickening as described above may be  related to non distention versus cystitis. 3) Right lower lobe pulmonary nodule. 4) Cholelithiasis   (M6) obeys commands

## 2023-09-19 NOTE — H&P ADULT - PROBLEM SELECTOR PLAN 4
SBP slightly elevated, but essentially at goal for this patient's age.  Unclear what medications he takes at home.   - would C?W home meds, but pt';s pharmacy has nothing picked up since January 01/2023. SBP slightly elevated, but essentially at goal for this patient's age.  Unclear what medications he takes at home.   - would C/W home meds, but pt's pharmacy has nothing picked up since January 01/2023.

## 2023-09-19 NOTE — H&P ADULT - NSICDXPASTSURGICALHX_GEN_ALL_CORE_FT
PAST SURGICAL HISTORY:  S/P CABG (coronary artery bypass graft)      PAST SURGICAL HISTORY:  History of back surgery     S/P CABG (coronary artery bypass graft)

## 2023-09-19 NOTE — MEDICAL STUDENT ADULT H&P (EDUCATION) - PLAN 4
Administer morning dose of glargine for basal coverage and lispro before meals, and keep patient on a controlled carbohydrate diet Pt with reported PreDM, not on any medications based on electronic pharmacy pull. BGL on BMP normal for non-fasting sugar.   - Await A1c before any interventions  - If A1c > 7.5%, may need basal-bolus

## 2023-09-20 LAB
A1C WITH ESTIMATED AVERAGE GLUCOSE RESULT: 6.4 % — HIGH (ref 4–5.6)
ALBUMIN SERPL ELPH-MCNC: 3.6 G/DL — SIGNIFICANT CHANGE UP (ref 3.3–5)
ALP SERPL-CCNC: 53 U/L — SIGNIFICANT CHANGE UP (ref 40–120)
ALT FLD-CCNC: 41 U/L — SIGNIFICANT CHANGE UP (ref 4–41)
ANION GAP SERPL CALC-SCNC: 17 MMOL/L — HIGH (ref 7–14)
APTT BLD: 119.9 SEC — HIGH (ref 24.5–35.6)
APTT BLD: 138.4 SEC — CRITICAL HIGH (ref 24.5–35.6)
AST SERPL-CCNC: 97 U/L — HIGH (ref 4–40)
BASOPHILS # BLD AUTO: 0.02 K/UL — SIGNIFICANT CHANGE UP (ref 0–0.2)
BASOPHILS NFR BLD AUTO: 0.2 % — SIGNIFICANT CHANGE UP (ref 0–2)
BILIRUB SERPL-MCNC: 0.4 MG/DL — SIGNIFICANT CHANGE UP (ref 0.2–1.2)
BUN SERPL-MCNC: 26 MG/DL — HIGH (ref 7–23)
CALCIUM SERPL-MCNC: 8 MG/DL — LOW (ref 8.4–10.5)
CHLORIDE SERPL-SCNC: 106 MMOL/L — SIGNIFICANT CHANGE UP (ref 98–107)
CK MB BLD-MCNC: 0.2 % — SIGNIFICANT CHANGE UP (ref 0–2.5)
CK MB CFR SERPL CALC: 6.9 NG/ML — HIGH
CK SERPL-CCNC: 3130 U/L — HIGH (ref 30–200)
CO2 SERPL-SCNC: 17 MMOL/L — LOW (ref 22–31)
CREAT SERPL-MCNC: 1.29 MG/DL — SIGNIFICANT CHANGE UP (ref 0.5–1.3)
EGFR: 53 ML/MIN/1.73M2 — LOW
EOSINOPHIL # BLD AUTO: 0.13 K/UL — SIGNIFICANT CHANGE UP (ref 0–0.5)
EOSINOPHIL NFR BLD AUTO: 1.5 % — SIGNIFICANT CHANGE UP (ref 0–6)
ESTIMATED AVERAGE GLUCOSE: 137 — SIGNIFICANT CHANGE UP
GLUCOSE SERPL-MCNC: 139 MG/DL — HIGH (ref 70–99)
HCT VFR BLD CALC: 39 % — SIGNIFICANT CHANGE UP (ref 39–50)
HGB BLD-MCNC: 13.2 G/DL — SIGNIFICANT CHANGE UP (ref 13–17)
IANC: 4.84 K/UL — SIGNIFICANT CHANGE UP (ref 1.8–7.4)
IMM GRANULOCYTES NFR BLD AUTO: 0.6 % — SIGNIFICANT CHANGE UP (ref 0–0.9)
LYMPHOCYTES # BLD AUTO: 2.5 K/UL — SIGNIFICANT CHANGE UP (ref 1–3.3)
LYMPHOCYTES # BLD AUTO: 29.6 % — SIGNIFICANT CHANGE UP (ref 13–44)
MAGNESIUM SERPL-MCNC: 2.1 MG/DL — SIGNIFICANT CHANGE UP (ref 1.6–2.6)
MCHC RBC-ENTMCNC: 30.8 PG — SIGNIFICANT CHANGE UP (ref 27–34)
MCHC RBC-ENTMCNC: 33.8 GM/DL — SIGNIFICANT CHANGE UP (ref 32–36)
MCV RBC AUTO: 90.9 FL — SIGNIFICANT CHANGE UP (ref 80–100)
MONOCYTES # BLD AUTO: 0.9 K/UL — SIGNIFICANT CHANGE UP (ref 0–0.9)
MONOCYTES NFR BLD AUTO: 10.7 % — SIGNIFICANT CHANGE UP (ref 2–14)
NEUTROPHILS # BLD AUTO: 4.84 K/UL — SIGNIFICANT CHANGE UP (ref 1.8–7.4)
NEUTROPHILS NFR BLD AUTO: 57.4 % — SIGNIFICANT CHANGE UP (ref 43–77)
NRBC # BLD: 0 /100 WBCS — SIGNIFICANT CHANGE UP (ref 0–0)
NRBC # FLD: 0 K/UL — SIGNIFICANT CHANGE UP (ref 0–0)
PHOSPHATE SERPL-MCNC: 3.5 MG/DL — SIGNIFICANT CHANGE UP (ref 2.5–4.5)
PLATELET # BLD AUTO: 190 K/UL — SIGNIFICANT CHANGE UP (ref 150–400)
POTASSIUM SERPL-MCNC: 3.8 MMOL/L — SIGNIFICANT CHANGE UP (ref 3.5–5.3)
POTASSIUM SERPL-SCNC: 3.8 MMOL/L — SIGNIFICANT CHANGE UP (ref 3.5–5.3)
PROT SERPL-MCNC: 6.4 G/DL — SIGNIFICANT CHANGE UP (ref 6–8.3)
RBC # BLD: 4.29 M/UL — SIGNIFICANT CHANGE UP (ref 4.2–5.8)
RBC # FLD: 14.2 % — SIGNIFICANT CHANGE UP (ref 10.3–14.5)
SODIUM SERPL-SCNC: 140 MMOL/L — SIGNIFICANT CHANGE UP (ref 135–145)
WBC # BLD: 8.44 K/UL — SIGNIFICANT CHANGE UP (ref 3.8–10.5)
WBC # FLD AUTO: 8.44 K/UL — SIGNIFICANT CHANGE UP (ref 3.8–10.5)

## 2023-09-20 PROCEDURE — 93306 TTE W/DOPPLER COMPLETE: CPT | Mod: 26

## 2023-09-20 PROCEDURE — 99233 SBSQ HOSP IP/OBS HIGH 50: CPT | Mod: GC

## 2023-09-20 PROCEDURE — 99223 1ST HOSP IP/OBS HIGH 75: CPT

## 2023-09-20 RX ORDER — METOPROLOL TARTRATE 50 MG
25 TABLET ORAL DAILY
Refills: 0 | Status: DISCONTINUED | OUTPATIENT
Start: 2023-09-20 | End: 2023-09-25

## 2023-09-20 RX ORDER — FUROSEMIDE 40 MG
1 TABLET ORAL
Refills: 0 | DISCHARGE

## 2023-09-20 RX ORDER — FUROSEMIDE 40 MG
20 TABLET ORAL DAILY
Refills: 0 | Status: DISCONTINUED | OUTPATIENT
Start: 2023-09-20 | End: 2023-09-20

## 2023-09-20 RX ORDER — HEPARIN SODIUM 5000 [USP'U]/ML
1100 INJECTION INTRAVENOUS; SUBCUTANEOUS
Qty: 25000 | Refills: 0 | Status: DISCONTINUED | OUTPATIENT
Start: 2023-09-20 | End: 2023-09-20

## 2023-09-20 RX ORDER — POTASSIUM CHLORIDE 20 MEQ
20 PACKET (EA) ORAL ONCE
Refills: 0 | Status: COMPLETED | OUTPATIENT
Start: 2023-09-20 | End: 2023-09-20

## 2023-09-20 RX ORDER — FLUTICASONE PROPIONATE 50 MCG
2 SPRAY, SUSPENSION NASAL
Refills: 0 | DISCHARGE

## 2023-09-20 RX ORDER — APIXABAN 2.5 MG/1
5 TABLET, FILM COATED ORAL
Refills: 0 | Status: DISCONTINUED | OUTPATIENT
Start: 2023-09-20 | End: 2023-09-25

## 2023-09-20 RX ORDER — SODIUM CHLORIDE 9 MG/ML
1000 INJECTION, SOLUTION INTRAVENOUS
Refills: 0 | Status: DISCONTINUED | OUTPATIENT
Start: 2023-09-20 | End: 2023-09-21

## 2023-09-20 RX ORDER — TAMSULOSIN HYDROCHLORIDE 0.4 MG/1
1 CAPSULE ORAL
Refills: 0 | DISCHARGE

## 2023-09-20 RX ADMIN — APIXABAN 5 MILLIGRAM(S): 2.5 TABLET, FILM COATED ORAL at 17:48

## 2023-09-20 RX ADMIN — CLOPIDOGREL BISULFATE 75 MILLIGRAM(S): 75 TABLET, FILM COATED ORAL at 11:49

## 2023-09-20 RX ADMIN — REMDESIVIR 200 MILLIGRAM(S): 5 INJECTION INTRAVENOUS at 23:57

## 2023-09-20 RX ADMIN — TAMSULOSIN HYDROCHLORIDE 0.4 MILLIGRAM(S): 0.4 CAPSULE ORAL at 21:45

## 2023-09-20 RX ADMIN — HEPARIN SODIUM 11 UNIT(S)/HR: 5000 INJECTION INTRAVENOUS; SUBCUTANEOUS at 05:08

## 2023-09-20 RX ADMIN — Medication 25 MILLIGRAM(S): at 13:19

## 2023-09-20 RX ADMIN — SODIUM CHLORIDE 100 MILLILITER(S): 9 INJECTION, SOLUTION INTRAVENOUS at 15:35

## 2023-09-20 RX ADMIN — Medication 20 MILLIEQUIVALENT(S): at 11:49

## 2023-09-20 NOTE — PROGRESS NOTE ADULT - PROBLEM SELECTOR PLAN 6
Incidentally noted calcified lesion in anterior/frontal cranium, likely meningioma. No focal neuro deficits.  - Will report in discharge summary for OP follow-up  - No urgent intervention required

## 2023-09-20 NOTE — PHYSICAL THERAPY INITIAL EVALUATION ADULT - GENERAL OBSERVATIONS, REHAB EVAL
Pt received seated OOB in a chair , +IV, +very [place] , pt in no apparent distress , /88 109 94%.

## 2023-09-20 NOTE — PHYSICAL THERAPY INITIAL EVALUATION ADULT - GAIT DISTANCE, PT EVAL
20 ft , left pt in the bathroom , ANSELMO Soliman made aware and agreed to leave the pt and RN will assist from there.

## 2023-09-20 NOTE — CONSULT NOTE ADULT - ATTENDING COMMENTS
patient seen and examined   labs and vitals reviewed  agree with above assessment and plan   multiple medical issues at baseline, now with covid  suspect syncope from covid  afib on tele, ?new  currently rate controlled  agree with toprol 25  agree with ac  in setting of elevated chadsvasc, if not contraindicated  tte pending  cont tele  cont supportive care of covid per primary team

## 2023-09-20 NOTE — PROGRESS NOTE ADULT - PROBLEM SELECTOR PLAN 11
Hospital Bundle  FEN: PO ad caroline, STrict I/O's   PPX  ---VTE: Heparin gtt  ---GI: Diet  ---Resp: IS  Access: PIVs  Code Status: FULL  Dispo: Tele, pending cardiac evaluation

## 2023-09-20 NOTE — PROGRESS NOTE ADULT - ATTENDING COMMENTS
Patient seen and examined with team in ed  Agree with above a/p by Dr Milian  89 yo M with h/o HTN, DM, CVA, CAD s/p Cabg, Parkinson p/w weakness to ED.   He was Diagnosed with covid 9/18 at urgent care  c/o 2 days of sore throat/ chills/ cough and white phegm  Slid out of chair 9/18. Patient seen in Ed with wife, noted they had PE 2 weeks ago. no irregular heart beats then  PE NAD   Vital Signs Last 24 Hrs  T(F): 99.3HR: 80 BP: 138/64 RR: 18 SpO2: 99%  room air  Lungs cta, cor irreg, irreg, abd soft n/t, ext neg e/c/c. Neueo- CN 11-xii intact. no focal deficit. Strenth 5 plus b/l                        13.1   7.54  )-----------( 201      ( 19 Sep 2023 11:00 )             39.9   09-19    139  |  101  |  23  ----------------------------<  144<H>  3.9   |  24  |  1.35<H>  Elevated HStrop and CPK 1485--> 1670 with NL ckbm.    ad< from: CT Head No Cont (09.19.23 @ 11:51) >    rd< from: CT Head No Cont (09.19.23 @ 11:51) >  IMPRESSION:  No acute intracranial hemorrhage, brain edema, or mass effect.  No displaced calvarial fracture. Chronic left parietal infarct.  1.2 x 0.7 cm calcified extra-axial lesion in the left lateral frontal  region, likely a meningioma.      09-20    140  |  106  |  26<H>  ----------------------------<  139<H>  3.8   |  17<L>  |  1.29      A/P  # Afib, new . Monitor on tele. Tsh. Echo. LUANN- negative  Cardiology appreciated. Transition to Eliquis.  # Mild Rhabdo from fall. IVF hydration and monitor cpk   CPK 3000, IVF and f/u creat in AM  # Covid positive, oxygen Sat nl . no need for steroids. Remdesivir x 3 days. Airbron isolation and contact isolation.  # Creat 1,35  # DM fs with Ss coverage. Monitor Hgb aic AM  # Neuro- small meningioma on Ct of head. Observe.  CVA, old stroke seen on CT. observe. Elevated benjie vasc score 7.5. DOAC on d/c.  # dvt proph- now on Eliquis  Pt recommended REHAB Patient seen and examined with team in ed  Agree with above a/p by Dr Milian  89 yo M with h/o HTN, DM, CVA, CAD s/p Cabg, Parkinson p/w weakness to ED.   He was Diagnosed with covid 9/18 at urgent care  c/o 2 days of sore throat/ chills/ cough and white phegm  Slid out of chair 9/18. Patient seen in Ed with wife, noted they had PE 2 weeks ago. no irregular heart beats then  PE NAD   Vital Signs Last 24 Hrs  T(F): 99.3HR: 80 BP: 138/64 RR: 18 SpO2: 99%  room air  Lungs cta, cor irreg, irreg, abd soft n/t, ext neg e/c/c. Neuro- CN 11-xii intact. no focal deficit. Strenth 5 plus b/l            rd< from: CT Head No Cont (09.19.23 @ 11:51) >  IMPRESSION:  No acute intracranial hemorrhage, brain edema, or mass effect.  No displaced calvarial fracture. Chronic left parietal infarct.  1.2 x 0.7 cm calcified extra-axial lesion in the left lateral frontal  region, likely a meningioma.      09-20    140  |  106  |  26<H>  ----------------------------<  139<H>  3.8   |  17<L>  |  1.29      A/P  # Afib, new . Monitor on tele. Tsh. Echo. LUANN- negative  Cardiology appreciated. Transition to Eliquis.  # Mild Rhabdo from fall. IVF hydration and monitor cpk   >CPK 3000, IVF and f/u creat in AM  # Covid positive, oxygen Sat nl . no need for steroids. Remdesivir x 3 days. Airbron isolation and contact isolation.  # Creat 1.35--> 1.29  # DM fs with Ss coverage. Monitor Hgb aic AM  # Neuro- small meningioma on Ct of head. Observe.  CVA, old stroke seen on CT. observe. Elevated benjie vasc score 7.5. DOAC on d/c.  # dvt proph- now on Eliquis  Pt recommended REHAB Patient seen and examined with team in ed  Agree with above a/p by Dr Milian  87 yo M with h/o HTN, DM, CVA, CAD s/p Cabg, Parkinson p/w weakness to ED.   He was Diagnosed with covid 9/18 at urgent care  c/o 2 days of sore throat/ chills/ cough and white phegm  Slid out of chair 9/18. Patient seen in Ed with wife, noted they had PE 2 weeks ago. no irregular heart beats then  PE NAD   Vital Signs Last 24 Hrs  T(F): 99.3HR: 80 BP: 138/64 RR: 18 SpO2: 99%  room air  Lungs cta, cor irreg, irreg, abd soft n/t, ext neg e/c/c. Neuro- CN 11-xii intact. no focal deficit. Strenth 5 plus b/l            rd< from: CT Head No Cont (09.19.23 @ 11:51) >  IMPRESSION:  No acute intracranial hemorrhage, brain edema, or mass effect.  No displaced calvarial fracture. Chronic left parietal infarct.  1.2 x 0.7 cm calcified extra-axial lesion in the left lateral frontal  region, likely a meningioma.      09-20    140  |  106  |  26<H>  ----------------------------<  139<H>  3.8   |  17<L>  |  1.29      A/P  # Afib, new . Monitor on tele. Tsh. Echo. LUANN- negative  Cardiology appreciated. Transition to Eliquis.  # Mild Rhabdo from fall. IVF hydration and monitor cpk   >CPK 3000, IVF and f/u creat in AM  # Covid positive, oxygen Sat nl . no need for steroids. Remdesivir x 2/ 3 days. Airbron isolation and contact isolation.  # Creat 1.35--> 1.29  # DM fs with Ss coverage. Monitor Hgb aic AM  # Neuro- small meningioma on Ct of head. Observe.  CVA, old stroke seen on CT. observe. Elevated benjie vasc score 7.5. DOAC on d/c.  # dvt proph- now on Eliquis  Pt recommended REHAB

## 2023-09-20 NOTE — PROGRESS NOTE ADULT - PROBLEM SELECTOR PLAN 1
Unclear chronicity, PCP says never documented, but pt also with poor adherence to recommended OP follow-up. Pt's weakness appears more related to strength of muscles as opposed to dyspnea/lightheadedness, but pt did have some of dizziness. Unclear if trigger related to COVID vs underlying cardiac disease. Currently rate-controlled w/o AC  Diagnostics  - TTE  - Tele monitoring  - Cards C/S in AM, Appreciate Recs    Therapeutics  - Heparin gtt (no bolus): goal aPPT 60-80

## 2023-09-20 NOTE — PROGRESS NOTE ADULT - PROBLEM SELECTOR PLAN 2
Pt with reportedly cough, sputum, sore throat, generalized weakness. Likely viral syndrome ico COVID-19+. Pt in no resp distress & not hypoxic.  - Start remdesivir 3 day course Pt with reportedly cough, sputum, sore throat, generalized weakness. Likely viral syndrome ico COVID-19+. Pt in no resp distress & not hypoxic.  - Remdesivir 3 day course (9/19-9/21)

## 2023-09-20 NOTE — PROGRESS NOTE ADULT - PROBLEM SELECTOR PLAN 5
Unclear diabetic history. No a1c in system  - A1c check  - Start consistent carb diet  - May need basal/bolus, but would await A1c

## 2023-09-20 NOTE — PROGRESS NOTE ADULT - ASSESSMENT
88M with h/o HTN, HLD c/b CAD s/p CABG, MI (2021), Parkinson's, CVA (no residual deficits), ?PreDM who presents with generalized weakness and found to be COVID-19+ and possible new AFib (not on AC). Pt is hemodynamically stable & rate-controlled, but pt too weak to consistently ambulate on his own at this time, which is likely related to COVID-19 and/or related myopathy.

## 2023-09-20 NOTE — PROGRESS NOTE ADULT - PROBLEM SELECTOR PLAN 4
SBP slightly elevated, but essentially at goal for this patient's age.  Unclear what medications he takes at home.   - would C/W home meds, but pt's pharmacy has nothing picked up since January 01/2023.
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
## 2023-09-20 NOTE — PROGRESS NOTE ADULT - PROBLEM SELECTOR PLAN 8
Pt with CK > 1.5k. Will start gentle IVF given unclear cardiac function & worsening rhabdo  - Hold statins   - start 50cc/hr NS

## 2023-09-20 NOTE — PROGRESS NOTE ADULT - SUBJECTIVE AND OBJECTIVE BOX
Medicine Progress Note  --------------------  Alpa Milian M.D.  Internal Medicine/Emergency Medicine  PGY-1  --------------------      Patient: EVAN SANCHEZ, MRN: [id number], : 1935  Admitted on 23 for Atrial fibrillation      LANGUAGE - English              ------------------------------------------------------------------------------------------------------------  SUMMARY (from last progress note through 23 @ 06:00):   88yom w/ hx significant for CVA (w/o residual deficit), CAD s/p CABG and MI (), parkinson's, pre-DM, HTN presents for generalized weakness for one day, found to be in new-onset a-fib and COVID +, pending further cardiac evaluation.   ------------------------------------------------------------------------------------------------------------  OVERNIGHT EVENTS  NAEON    SUBJECTIVE  -       ROS negative unless noted above.  ------------------------------------------------------------------------------------------------------------  OBJECTIVE:  Physical Exam  GEN: Awake, AOx3, NAD.  HEENT: NCAT  ---EYES: no scleral icterus, EOMI, PERRLA  CARDIO: RRR. Normal S1/S2, no m/r/g. No JVD.  RESP: CTAB, no w/r/r; distant  ABD: Soft, NTND.   MSK: No obvious deformity or ROM deficit.   SKIN: Warm, dry.  NEURO: Moves all four extremities spontaneously; +pill-rolling tremor b/l (L > R)  -- 5/5 strenght throughout, though difficulty lifting body weight from bed  -- allowing for pill-rolling tremor, intact finger-to-nose b/l  PSYCH: Appropriate mood & affect.    Vital Signs Last 24 Hrs  T(F): 99.2, Max: 99.3 (23 @ 11:01)  HR: 90 (66 - 93)  BP: 149/82 (125/71 - 149/82)  RR: 18 (17 - 18)  SpO2: 97% (93% - 99%)        DAILY MEASUREMENTS:  I&O's Summary    Daily Height in cm: 170.18 (19 Sep 2023 21:05)    Daily   Weight (kg): 75.2 (23 @ 19:35)  Orthostatic VS        LABS:                        13.2   8.44  )-----------( 190      ( 20 Sep 2023 03:32 )             39.0     Hgb Trend: 13.2<--, 13.1<--      140  |  106  |  26<H>  ----------------------------<  139<H>  3.8   |  17<L>  |  1.29    Ca    8.0<L>      20 Sep 2023 03:32  Phos  3.5     09-20  Mg     2.10     09-20    TPro  6.4  /  Alb  3.6  /  TBili  0.4  /  DBili  x   /  AST  97<H>  /  ALT  41  /  AlkPhos  53  09-20    PTT - ( 20 Sep 2023 03:32 )  PTT:119.9 sec  CAPILLARY BLOOD GLUCOSE        CARDIAC MARKERS ( 20 Sep 2023 03:32 )  x     / x     / 3130 U/L / x     / x      CARDIAC MARKERS ( 19 Sep 2023 13:37 )  x     / x     / 1670 U/L / x     / 5.4 ng/mL  CARDIAC MARKERS ( 19 Sep 2023 11:00 )  x     / x     / 1485 U/L / x     / 5.4 ng/mL      Urinalysis Basic - ( 20 Sep 2023 03:32 )    Color: x / Appearance: x / SG: x / pH: x  Gluc: 139 mg/dL / Ketone: x  / Bili: x / Urobili: x   Blood: x / Protein: x / Nitrite: x   Leuk Esterase: x / RBC: x / WBC x   Sq Epi: x / Non Sq Epi: x / Bacteria: x              RADIOLOGY & ADDITIONAL TESTS:  Results Reviewed:     Imaging Reviewed:  Electrocardiogram Reviewed:      ------------------------------------------------------------------------------------------------------------  MEDICATIONS  (STANDING):  aspirin enteric coated 81 milliGRAM(s) Oral daily  clopidogrel Tablet 75 milliGRAM(s) Oral daily  heparin  Infusion 1100 Unit(s)/Hr (11 mL/Hr) IV Continuous <Continuous>  remdesivir  IVPB 100 milliGRAM(s) IV Intermittent every 24 hours  remdesivir  IVPB   IV Intermittent   sodium chloride 0.9%. 1000 milliLiter(s) (75 mL/Hr) IV Continuous <Continuous>  tamsulosin 0.4 milliGRAM(s) Oral at bedtime    MEDICATIONS  (PRN):    ------------------------------------------------------------------------------------------------------------  COORDINATION OF CARE:  Care discussed with consultants/other providers and notes reviewed [Y]     Medicine Progress Note  --------------------  Alpa Milian M.D.  Internal Medicine/Emergency Medicine  PGY-1  --------------------      Patient: EVAN SANCHEZ, MRN: [id number], : 1935  Admitted on 23 for Atrial fibrillation      LANGUAGE - English              ------------------------------------------------------------------------------------------------------------  SUMMARY (from last progress note through 23 @ 06:00):   88yom w/ hx significant for CVA (w/o residual deficit), CAD s/p CABG and MI (), parkinson's, pre-DM, HTN presents for generalized weakness for one day, found to be in new-onset a-fib and COVID +, pending further cardiac evaluation.   ------------------------------------------------------------------------------------------------------------  OVERNIGHT EVENTS  Hep gtt downtitrated to 11 given elevated aPTT    SUBJECTIVE  -       ROS negative unless noted above.  ------------------------------------------------------------------------------------------------------------  OBJECTIVE:  Physical Exam  GEN: Awake, AOx3, NAD.  HEENT: NCAT  ---EYES: no scleral icterus, EOMI, PERRLA  CARDIO: RRR. Normal S1/S2, no m/r/g. No JVD.  RESP: CTAB, no w/r/r; distant  ABD: Soft, NTND.   MSK: No obvious deformity or ROM deficit.   SKIN: Warm, dry.  NEURO: Moves all four extremities spontaneously; +pill-rolling tremor b/l (L > R)  -- 5/5 strenght throughout, though difficulty lifting body weight from bed  -- allowing for pill-rolling tremor, intact finger-to-nose b/l  PSYCH: Appropriate mood & affect.    Vital Signs Last 24 Hrs  T(F): 99.2, Max: 99.3 (23 @ 11:01)  HR: 90 (66 - 93)  BP: 149/82 (125/71 - 149/82)  RR: 18 (17 - 18)  SpO2: 97% (93% - 99%)        DAILY MEASUREMENTS:  I&O's Summary    Daily Height in cm: 170.18 (19 Sep 2023 21:05)    Daily   Weight (kg): 75.2 (23 @ 19:35)  Orthostatic VS        LABS:                        13.2   8.44  )-----------( 190      ( 20 Sep 2023 03:32 )             39.0     Hgb Trend: 13.2<--, 13.1<--      140  |  106  |  26<H>  ----------------------------<  139<H>  3.8   |  17<L>  |  1.29    Ca    8.0<L>      20 Sep 2023 03:32  Phos  3.5     20  Mg     2.10     20    TPro  6.4  /  Alb  3.6  /  TBili  0.4  /  DBili  x   /  AST  97<H>  /  ALT  41  /  AlkPhos  53  09-20    PTT - ( 20 Sep 2023 03:32 )  PTT:119.9 sec  CAPILLARY BLOOD GLUCOSE        CARDIAC MARKERS ( 20 Sep 2023 03:32 )  x     / x     / 3130 U/L / x     / x      CARDIAC MARKERS ( 19 Sep 2023 13:37 )  x     / x     / 1670 U/L / x     / 5.4 ng/mL  CARDIAC MARKERS ( 19 Sep 2023 11:00 )  x     / x     / 1485 U/L / x     / 5.4 ng/mL      Urinalysis Basic - ( 20 Sep 2023 03:32 )    Color: x / Appearance: x / SG: x / pH: x  Gluc: 139 mg/dL / Ketone: x  / Bili: x / Urobili: x   Blood: x / Protein: x / Nitrite: x   Leuk Esterase: x / RBC: x / WBC x   Sq Epi: x / Non Sq Epi: x / Bacteria: x              RADIOLOGY & ADDITIONAL TESTS:  Results Reviewed:     Imaging Reviewed:  Electrocardiogram Reviewed:      ------------------------------------------------------------------------------------------------------------  MEDICATIONS  (STANDING):  aspirin enteric coated 81 milliGRAM(s) Oral daily  clopidogrel Tablet 75 milliGRAM(s) Oral daily  heparin  Infusion 1100 Unit(s)/Hr (11 mL/Hr) IV Continuous <Continuous>  remdesivir  IVPB 100 milliGRAM(s) IV Intermittent every 24 hours  remdesivir  IVPB   IV Intermittent   sodium chloride 0.9%. 1000 milliLiter(s) (75 mL/Hr) IV Continuous <Continuous>  tamsulosin 0.4 milliGRAM(s) Oral at bedtime    MEDICATIONS  (PRN):    ------------------------------------------------------------------------------------------------------------  COORDINATION OF CARE:  Care discussed with consultants/other providers and notes reviewed [Y]     Medicine Progress Note  --------------------  Alpa Milian M.D.  Internal Medicine/Emergency Medicine  PGY-1  --------------------      Patient: EVAN SANCHEZ, MRN: [id number], : 1935  Admitted on 23 for Atrial fibrillation      LANGUAGE - English              ------------------------------------------------------------------------------------------------------------  SUMMARY (from last progress note through 23 @ 06:00):   88yom w/ hx significant for CVA (w/o residual deficit), CAD s/p CABG and MI (), parkinson's, pre-DM, HTN presents for generalized weakness for one day, found to be in new-onset a-fib and COVID +, pending further cardiac evaluation.   ------------------------------------------------------------------------------------------------------------  OVERNIGHT EVENTS  Hep gtt down-titrated to 11 given elevated aPTT    SUBJECTIVE  - Pt seen this am sitting on chair, states he is still feeling weak and having hard time turning in bed. Endorses occasional cough with sputum production. Denies CP, SOB.       ROS negative unless noted above.  ------------------------------------------------------------------------------------------------------------  OBJECTIVE:  Physical Exam  GEN: Awake, AOx3, NAD.  HEENT: NCAT  ---EYES: no scleral icterus, EOMI, PERRLA  CARDIO: RRR. Normal S1/S2, no m/r/g. No JVD.  RESP: CTAB, no w/r/r; distant  ABD: Soft, NTND.   MSK: No obvious deformity or ROM deficit.   SKIN: Warm, dry.  NEURO: Moves all four extremities spontaneously; +pill-rolling tremor b/l (L > R)  -- 5/5 strenght throughout, though difficulty lifting body weight from bed  -- allowing for pill-rolling tremor, intact finger-to-nose b/l  PSYCH: Appropriate mood & affect.    Vital Signs Last 24 Hrs  T(F): 99.2, Max: 99.3 (23 @ 11:01)  HR: 90 (66 - 93)  BP: 149/82 (125/71 - 149/82)  RR: 18 (17 - 18)  SpO2: 97% (93% - 99%)        DAILY MEASUREMENTS:  I&O's Summary    Daily Height in cm: 170.18 (19 Sep 2023 21:05)    Daily   Weight (kg): 75.2 (23 @ 19:35)  Orthostatic VS        LABS:                        13.2   8.44  )-----------( 190      ( 20 Sep 2023 03:32 )             39.0     Hgb Trend: 13.2<--, 13.1<--  09    140  |  106  |  26<H>  ----------------------------<  139<H>  3.8   |  17<L>  |  1.29    Ca    8.0<L>      20 Sep 2023 03:32  Phos  3.5     09-20  Mg     2.10     09-20    TPro  6.4  /  Alb  3.6  /  TBili  0.4  /  DBili  x   /  AST  97<H>  /  ALT  41  /  AlkPhos  53  09-20    PTT - ( 20 Sep 2023 03:32 )  PTT:119.9 sec  CAPILLARY BLOOD GLUCOSE        CARDIAC MARKERS ( 20 Sep 2023 03:32 )  x     / x     / 3130 U/L / x     / x      CARDIAC MARKERS ( 19 Sep 2023 13:37 )  x     / x     / 1670 U/L / x     / 5.4 ng/mL  CARDIAC MARKERS ( 19 Sep 2023 11:00 )  x     / x     / 1485 U/L / x     / 5.4 ng/mL      Urinalysis Basic - ( 20 Sep 2023 03:32 )    Color: x / Appearance: x / SG: x / pH: x  Gluc: 139 mg/dL / Ketone: x  / Bili: x / Urobili: x   Blood: x / Protein: x / Nitrite: x   Leuk Esterase: x / RBC: x / WBC x   Sq Epi: x / Non Sq Epi: x / Bacteria: x              RADIOLOGY & ADDITIONAL TESTS:  Results Reviewed:     Imaging Reviewed:  Electrocardiogram Reviewed:      ------------------------------------------------------------------------------------------------------------  MEDICATIONS  (STANDING):  aspirin enteric coated 81 milliGRAM(s) Oral daily  clopidogrel Tablet 75 milliGRAM(s) Oral daily  heparin  Infusion 1100 Unit(s)/Hr (11 mL/Hr) IV Continuous <Continuous>  remdesivir  IVPB 100 milliGRAM(s) IV Intermittent every 24 hours  remdesivir  IVPB   IV Intermittent   sodium chloride 0.9%. 1000 milliLiter(s) (75 mL/Hr) IV Continuous <Continuous>  tamsulosin 0.4 milliGRAM(s) Oral at bedtime    MEDICATIONS  (PRN):    ------------------------------------------------------------------------------------------------------------  COORDINATION OF CARE:  Care discussed with consultants/other providers and notes reviewed [Y]

## 2023-09-20 NOTE — PROGRESS NOTE ADULT - PROBLEM SELECTOR PLAN 7
Pt with pill-rolling tremor, unclear if pt takes meds for this.   - unclear medication hx, will try to obtain colalteral and call pharmacy for med list
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
## 2023-09-20 NOTE — PHYSICAL THERAPY INITIAL EVALUATION ADULT - PERTINENT HX OF CURRENT PROBLEM, REHAB EVAL
This is an 88M with h/o Parkinson's, CVA presents with generalized weakness and found to be COVID-19+ and possible new AFib.

## 2023-09-20 NOTE — PROGRESS NOTE ADULT - PROBLEM SELECTOR PLAN 3
Tro elevated but flat. ECG non-ishemic (no prior to compare). Unclear if related to elevated creatinine vs supply-demand mismatch ico COVID-19 & ?New afib.   - Trop 132->133 Tro elevated but flat. ECG non-ishemic (no prior to compare). Unclear if related to elevated creatinine vs supply-demand mismatch ico COVID-19 & ?New afib.   - Trop 132->133 flat

## 2023-09-21 DIAGNOSIS — I50.20 UNSPECIFIED SYSTOLIC (CONGESTIVE) HEART FAILURE: ICD-10-CM

## 2023-09-21 LAB
ALBUMIN SERPL ELPH-MCNC: 3.1 G/DL — LOW (ref 3.3–5)
ALP SERPL-CCNC: 45 U/L — SIGNIFICANT CHANGE UP (ref 40–120)
ALT FLD-CCNC: 37 U/L — SIGNIFICANT CHANGE UP (ref 4–41)
ANION GAP SERPL CALC-SCNC: 11 MMOL/L — SIGNIFICANT CHANGE UP (ref 7–14)
AST SERPL-CCNC: 102 U/L — HIGH (ref 4–40)
BASOPHILS # BLD AUTO: 0.03 K/UL — SIGNIFICANT CHANGE UP (ref 0–0.2)
BASOPHILS NFR BLD AUTO: 0.5 % — SIGNIFICANT CHANGE UP (ref 0–2)
BILIRUB SERPL-MCNC: 0.4 MG/DL — SIGNIFICANT CHANGE UP (ref 0.2–1.2)
BUN SERPL-MCNC: 33 MG/DL — HIGH (ref 7–23)
CALCIUM SERPL-MCNC: 7.9 MG/DL — LOW (ref 8.4–10.5)
CHLORIDE SERPL-SCNC: 106 MMOL/L — SIGNIFICANT CHANGE UP (ref 98–107)
CK SERPL-CCNC: 2635 U/L — HIGH (ref 30–200)
CO2 SERPL-SCNC: 22 MMOL/L — SIGNIFICANT CHANGE UP (ref 22–31)
CREAT SERPL-MCNC: 1.39 MG/DL — HIGH (ref 0.5–1.3)
EGFR: 49 ML/MIN/1.73M2 — LOW
EOSINOPHIL # BLD AUTO: 0 K/UL — SIGNIFICANT CHANGE UP (ref 0–0.5)
EOSINOPHIL NFR BLD AUTO: 0 % — SIGNIFICANT CHANGE UP (ref 0–6)
GLUCOSE SERPL-MCNC: 108 MG/DL — HIGH (ref 70–99)
HCT VFR BLD CALC: 36.8 % — LOW (ref 39–50)
HGB BLD-MCNC: 12.3 G/DL — LOW (ref 13–17)
IANC: 1.99 K/UL — SIGNIFICANT CHANGE UP (ref 1.8–7.4)
IMM GRANULOCYTES NFR BLD AUTO: 0.2 % — SIGNIFICANT CHANGE UP (ref 0–0.9)
LYMPHOCYTES # BLD AUTO: 3.1 K/UL — SIGNIFICANT CHANGE UP (ref 1–3.3)
LYMPHOCYTES # BLD AUTO: 53.3 % — HIGH (ref 13–44)
MAGNESIUM SERPL-MCNC: 2.2 MG/DL — SIGNIFICANT CHANGE UP (ref 1.6–2.6)
MCHC RBC-ENTMCNC: 30.8 PG — SIGNIFICANT CHANGE UP (ref 27–34)
MCHC RBC-ENTMCNC: 33.4 GM/DL — SIGNIFICANT CHANGE UP (ref 32–36)
MCV RBC AUTO: 92 FL — SIGNIFICANT CHANGE UP (ref 80–100)
MONOCYTES # BLD AUTO: 0.69 K/UL — SIGNIFICANT CHANGE UP (ref 0–0.9)
MONOCYTES NFR BLD AUTO: 11.9 % — SIGNIFICANT CHANGE UP (ref 2–14)
NEUTROPHILS # BLD AUTO: 1.99 K/UL — SIGNIFICANT CHANGE UP (ref 1.8–7.4)
NEUTROPHILS NFR BLD AUTO: 34.1 % — LOW (ref 43–77)
NRBC # BLD: 0 /100 WBCS — SIGNIFICANT CHANGE UP (ref 0–0)
NRBC # FLD: 0 K/UL — SIGNIFICANT CHANGE UP (ref 0–0)
PHOSPHATE SERPL-MCNC: 3.5 MG/DL — SIGNIFICANT CHANGE UP (ref 2.5–4.5)
PLATELET # BLD AUTO: 153 K/UL — SIGNIFICANT CHANGE UP (ref 150–400)
POTASSIUM SERPL-MCNC: 3.6 MMOL/L — SIGNIFICANT CHANGE UP (ref 3.5–5.3)
POTASSIUM SERPL-SCNC: 3.6 MMOL/L — SIGNIFICANT CHANGE UP (ref 3.5–5.3)
PROT SERPL-MCNC: 5.8 G/DL — LOW (ref 6–8.3)
RBC # BLD: 4 M/UL — LOW (ref 4.2–5.8)
RBC # FLD: 13.9 % — SIGNIFICANT CHANGE UP (ref 10.3–14.5)
SODIUM SERPL-SCNC: 139 MMOL/L — SIGNIFICANT CHANGE UP (ref 135–145)
WBC # BLD: 5.82 K/UL — SIGNIFICANT CHANGE UP (ref 3.8–10.5)
WBC # FLD AUTO: 5.82 K/UL — SIGNIFICANT CHANGE UP (ref 3.8–10.5)

## 2023-09-21 PROCEDURE — 99233 SBSQ HOSP IP/OBS HIGH 50: CPT | Mod: GC

## 2023-09-21 PROCEDURE — 99233 SBSQ HOSP IP/OBS HIGH 50: CPT

## 2023-09-21 RX ORDER — SODIUM CHLORIDE 9 MG/ML
1000 INJECTION, SOLUTION INTRAVENOUS
Refills: 0 | Status: DISCONTINUED | OUTPATIENT
Start: 2023-09-21 | End: 2023-09-25

## 2023-09-21 RX ORDER — POTASSIUM CHLORIDE 20 MEQ
40 PACKET (EA) ORAL ONCE
Refills: 0 | Status: COMPLETED | OUTPATIENT
Start: 2023-09-21 | End: 2023-09-21

## 2023-09-21 RX ADMIN — APIXABAN 5 MILLIGRAM(S): 2.5 TABLET, FILM COATED ORAL at 18:30

## 2023-09-21 RX ADMIN — TAMSULOSIN HYDROCHLORIDE 0.4 MILLIGRAM(S): 0.4 CAPSULE ORAL at 21:46

## 2023-09-21 RX ADMIN — APIXABAN 5 MILLIGRAM(S): 2.5 TABLET, FILM COATED ORAL at 05:29

## 2023-09-21 RX ADMIN — SODIUM CHLORIDE 100 MILLILITER(S): 9 INJECTION, SOLUTION INTRAVENOUS at 11:36

## 2023-09-21 RX ADMIN — Medication 25 MILLIGRAM(S): at 05:29

## 2023-09-21 RX ADMIN — CLOPIDOGREL BISULFATE 75 MILLIGRAM(S): 75 TABLET, FILM COATED ORAL at 11:54

## 2023-09-21 RX ADMIN — REMDESIVIR 200 MILLIGRAM(S): 5 INJECTION INTRAVENOUS at 23:51

## 2023-09-21 RX ADMIN — Medication 40 MILLIEQUIVALENT(S): at 11:54

## 2023-09-21 NOTE — PROGRESS NOTE ADULT - PROBLEM SELECTOR PLAN 2
Pt with reportedly cough, sputum, sore throat, generalized weakness. Likely viral syndrome ico COVID-19+. Pt in no resp distress & not hypoxic.  - Remdesivir 3 day course (9/19-9/21) TTE with EF 40-45% and apical WMA. May be related to pt's CAD s/p CABG, MI in the past though no other TTE in our system. Valve concerns noted, incl. poorly visualized but possibly concerning Aortic Valve. Given pt's improvement with BB & IVF with time course involving COVID, suspect near-syncope less likely cardiogenic / less likely related to aortic valve. Decreased inotropy would presumably decrease abilty to compensate for severe AS & he would be worse rather than better.  Diagnostics  - May need ROHITH per Cards (unclear)    Therapeutics  - BB: C/W Metoprolol succinate ER 25mg PO QD  - ACEi/ARB: Holding off ico creatinnie elevation  - Diuresis: HOLDing Home Furosemide 20mg PO QD ico rhabdomyolysis  -

## 2023-09-21 NOTE — PROGRESS NOTE ADULT - ATTENDING COMMENTS
Patient seen and examined with team in ed  Agree with above a/p by Dr Connors  89 yo M with h/o HTN, DM, CVA, CAD s/p Cabg, Parkinson p/w weakness to ED.   He was Diagnosed with covid 9/18 at urgent care c/o 2 days of sore throat/ chills/ cough and white phegm  Slid out of chair 9/18.   PE NAD   Vital Signs Last 24 Hrs  T(F): 97.2 HR: 93 BP: 119/62 RR: 18 SpO2: 94%  room air  Lungs cta, cor irreg, irreg, abd soft n/t, ext neg e/c/c. Neuro- CN 11-xii intact. no focal deficit. Strenth 5 plus b/l            rd< from: CT Head No Cont (09.19.23 @ 11:51) >  IMPRESSION:  No acute intracranial hemorrhage, brain edema, or mass effect.  No displaced calvarial fracture. Chronic left parietal infarct.  1.2 x 0.7 cm calcified extra-axial lesion in the left lateral frontal  region, likely a meningioma.    09-21    139  |  106  |  33<H>  ----------------------------<  108<H>  3.6   |  22  |  1.39<H>  eho< from: TTE W or WO Ultrasound Enhancing Agent (09.20.23 @ 10:25) >    CONCLUSIONS:      1. Left ventricular systolic function is moderately decreased with an ejection fraction visually estimated at 40 to 45 %. Left ventricular regional wall motion assessment revealshypokinesis of the apical and apical septal walls.   2. Right ventricular cavity is normal in size.   3. There is calcification of the mitral valve annulus.   4. Mild mitral valve leaflet calcification.   5. There is mild mitral valve stenosis.   6. Moderate mitral regurgitation.   7. Aortic valve was not well visualized. The aortic valve was not well visualized. The aortic valve appears trileaflet with reduced systolic excursion. There is calcification of the aortic valve leaflets. There is probably significant aortic stenosis. There is mild aortic regurgitation.        A/P  # New Afib/ syst CHF w/o exacerbation/ MOD MR and AS, CAD s/p Cabg with Abn EKG  Cardiology appreciated. Transition to [facility] on 9/20/23  # Mild Rhabdo from fall. IVF hydration and monitor cpk   CPK 2635 today with Creat 1.35. c/w IVF hydration.  # Covid positive, oxygen Sat nl . no need for steroids. Remdesivir x 3/ 3 days. Airbron isolation and contact isolation.  # Creat 1.35--> 1.29  # DM fs with Ss coverage. Monitor Hgb aic AM  # Neuro- small meningioma on Ct of head. Observe.  CVA, old stroke seen on CT. observe. Elevated benjie vasc score 7.5. DOAC on d/c.  # dvt proph- now on Eliquis  Pt recommended REHAB Patient seen and examined with team in ed  Agree with above a/p by Dr Connors  89 yo M with h/o HTN, DM, CVA, CAD s/p Cabg, Parkinson p/w weakness to ED.   He was Diagnosed with covid 9/18 at urgent care c/o 2 days of sore throat/ chills/ cough and white phegm  Slid out of chair 9/18.   PE NAD   Vital Signs Last 24 Hrs  T(F): 97.2 HR: 93 BP: 119/62 RR: 18 SpO2: 94%  room air  Lungs cta, cor irreg, irreg, abd soft n/t, ext neg e/c/c. Neuro- CN 11-xii intact. no focal deficit. Strenth 5 plus b/l            rd< from: CT Head No Cont (09.19.23 @ 11:51) >  IMPRESSION:  No acute intracranial hemorrhage, brain edema, or mass effect.  No displaced calvarial fracture. Chronic left parietal infarct.  1.2 x 0.7 cm calcified extra-axial lesion in the left lateral frontal  region, likely a meningioma.    09-21    139  |  106  |  33<H>  ----------------------------<  108<H>  3.6   |  22  |  1.39<H>  eho< from: TTE W or WO Ultrasound Enhancing Agent (09.20.23 @ 10:25) >    CONCLUSIONS:      1. Left ventricular systolic function is moderately decreased with an ejection fraction visually estimated at 40 to 45 %. Left ventricular regional wall motion assessment revealshypokinesis of the apical and apical septal walls.   2. Right ventricular cavity is normal in size.   3. There is calcification of the mitral valve annulus.   4. Mild mitral valve leaflet calcification.   5. There is mild mitral valve stenosis.   6. Moderate mitral regurgitation.   7. Aortic valve was not well visualized. The aortic valve was not well visualized. The aortic valve appears trileaflet with reduced systolic excursion. There is calcification of the aortic valve leaflets. There is probably significant aortic stenosis. There is mild aortic regurgitation.        A/P  # New Afib/ syst CHF w/o exacerbation/ MOD MR and AS, CAD s/p Cabg with Abn EKG  Cardiology appreciated. Transition to [facility] on 9/20/23  # Mild Rhabdo from fall. IVF hydration and monitor cpk   CPK 2635 today with Creat 1.35. c/w IVF hydration.  # Covid positive, oxygen Sat nl . no need for steroids. Remdesivir x 3/ 3 days. Airbron isolation and contact isolation.  # Creat 1.35--> 1.29  # DM fs with Ss coverage. Monitor Hgb aic AM  # Neuro- small meningioma on Ct of head. Observe.  CVA, old stroke seen on CT. observe. Elevated benjie vasc score 7.5. DOAC on d/c.  # dvt proph- now on Eliquis  Pt recommended REHAB  9/21/23 called wife and updated. Planning for REHAB

## 2023-09-21 NOTE — PROGRESS NOTE ADULT - PROBLEM SELECTOR PLAN 6
Incidentally noted calcified lesion in anterior/frontal cranium, likely meningioma. No focal neuro deficits.  - Will report in discharge summary for OP follow-up  - No urgent intervention required Unclear diabetic history. No a1c in system.  - A1c check  - Start consistent carb diet  - May need basal/bolus, but would await A1c

## 2023-09-21 NOTE — PROGRESS NOTE ADULT - PROBLEM SELECTOR PLAN 1
Unclear chronicity, PCP says never documented, but pt also with poor adherence to recommended OP follow-up. Pt's weakness appears more related to strength of muscles as opposed to dyspnea/lightheadedness, but pt did have some of dizziness. Unclear if trigger related to COVID vs underlying cardiac disease. Currently rate-controlled w/o AC  Diagnostics  - TTE: EF 40-45%, apical WMA  - Tele monitoring (likely dc today)  - Cards C/S in AM, Appreciate Recs    Therapeutics  - Apixaban 5mg PO BID Unclear chronicity, PCP says never documented, but pt also with poor adherence to recommended OP follow-up. Pt's weakness appears more related to strength of muscles as opposed to dyspnea/lightheadedness, but pt did have some of dizziness. Unclear if trigger related to COVID vs underlying cardiac disease. Currently rate-controlled w/o AC  Diagnostics  - TTE: EF 40-45%, apical WMA  - Tele monitoring (DC tele today)  - Cards C/S in AM, Appreciate Recs    Therapeutics  - Apixaban 5mg PO BID Unclear chronicity, PCP says never documented, but pt also with poor adherence to recommended OP follow-up. Pt's weakness appears more related to strength of muscles as opposed to dyspnea/lightheadedness, but pt did have some of dizziness. Unclear if trigger related to COVID vs underlying cardiac disease. Currently rate-controlled. AC started for [id number] = 7 w/o e/o contraindications.   Diagnostics  - TTE: EF 40-45%, apical WMA & enlarged LA (likely etiology for AFib)  - Cards C/S, Appreciate Recs (following)  - Pt converted to sinus arrhythmia this morning    Therapeutics  - Apixaban 5mg PO BID  - C/W Metoprolol succinate 25mg PO QD

## 2023-09-21 NOTE — PROGRESS NOTE ADULT - PROBLEM SELECTOR PLAN 7
Pt with pill-rolling tremor, unclear if pt takes meds for this.   - unclear medication hx, will try to obtain collateral and call pharmacy for med list Incidentally noted calcified lesion in anterior/frontal cranium, likely meningioma. No focal neuro deficits.  - Will report in discharge summary for OP follow-up  - No urgent intervention required

## 2023-09-21 NOTE — PROGRESS NOTE ADULT - PROBLEM SELECTOR PLAN 12
Hospital Bundle  FEN: PO ad caroline, Strict I/O's   PPX  ---VTE: Apixaban  ---GI: Diet  ---Resp: IS  Access: PIVs  Code Status: FULL  Dispo: Tele, pending cardiac evaluation

## 2023-09-21 NOTE — PROGRESS NOTE ADULT - PROBLEM SELECTOR PLAN 3
Tro elevated but flat. ECG non-ishemic (no prior to compare). Unclear if related to elevated creatinine vs supply-demand mismatch ico COVID-19 & ?New afib.   - Trop 132->133 flat Tro elevated but flat. ECG non-ishemic (no prior to compare). Unclear if related to elevated creatinine vs supply-demand mismatch ico COVID-19 & ?New afib.   - Trop 132->133 flat  - ECG's nonischemic  - Tele improved Pt with reportedly cough, sputum, sore throat, generalized weakness. Likely viral syndrome ico COVID-19+. Pt in no resp distress & not hypoxic.  - Remdesivir 3 day course (9/19-9/21)

## 2023-09-21 NOTE — PROGRESS NOTE ADULT - PROBLEM SELECTOR PLAN 9
BY documentation, unclear if any medications for this.  - monitor outputs  - ?tamsulosin Pt with CK > 1.5k. Will start gentle IVF given unclear cardiac function & worsening rhabdo  - Holding HOME Atorvstatin d/t risk for rhabdo  - trend to peak
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
## 2023-09-21 NOTE — PROGRESS NOTE ADULT - PROBLEM SELECTOR PLAN 11
Hospital Bundle  FEN: PO ad caroline, STrict I/O's   PPX  ---VTE: Apixaban  ---GI: Diet  ---Resp: IS  Access: PIVs  Code Status: FULL  Dispo: Tele, pending cardiac evaluation Hospital Bundle  FEN: PO ad caroline, Strict I/O's   PPX  ---VTE: Apixaban  ---GI: Diet  ---Resp: IS  Access: PIVs  Code Status: FULL  Dispo: Tele, pending cardiac evaluation Pt with elevated creatinine to 1.35. Unclear if FUAD or age-related elevated creatinine.  No significant electrolyte abnormalities or c/f alarming CKD that would require more urgent intervention.  - Trend BMPs  - avoid nephrotoxic agents

## 2023-09-21 NOTE — PROGRESS NOTE ADULT - PROBLEM SELECTOR PLAN 8
Pt with CK > 1.5k. Will start gentle IVF given unclear cardiac function & worsening rhabdo  - Holding HOME Atorvstatin d/t risk for rhabdo  - trend to peak Pt with pill-rolling tremor, unclear if pt takes meds for this. Doesn't seem like pt is actually on Carvidopa-Levodopa  - unclear medication hx, will try to obtain collateral and call pharmacy for med list

## 2023-09-21 NOTE — PROGRESS NOTE ADULT - SUBJECTIVE AND OBJECTIVE BOX
Cardiology Fellow Consult Progress Note    Subjective: no active CP or SOB    No acute events overnight. No significant tele events. ROS negative at the bedside.     REVIEW OF SYSTEMS:  All other review of systems is negative unless indicated above.    Physical Exam:  T(F): 98.8 (09-21), Max: 99.8 (09-20)  HR: 85 (09-21) (85 - 98)  BP: 118/66 (09-21) (116/65 - 166/91)  RR: 18 (09-21)  SpO2: 99% (09-21)  GENERAL: No acute distress, well-developed, elderly, hard of hearing  CHEST/LUNG: Clear to auscultation bilaterally  HEART: Irregular rate, +SM at right upper sternal border  ABDOMEN: Soft, Nontender, Nondistended  EXTREMITIES:  No edema  NEUROLOGY: AAOx3    Cardiovascular diagnostic testings: personally reviewed    Imaging diagnostic testings: personally reviewed    Labs: Personally reviewed                        12.3   5.82  )-----------( 153      ( 21 Sep 2023 05:41 )             36.8     09-21    139  |  106  |  33<H>  ----------------------------<  108<H>  3.6   |  22  |  1.39<H>    Ca    7.9<L>      21 Sep 2023 05:41  Phos  3.5     09-21  Mg     2.20     09-21    TPro  5.8<L>  /  Alb  3.1<L>  /  TBili  0.4  /  DBili  x   /  AST  102<H>  /  ALT  37  /  AlkPhos  45  09-21    PTT - ( 20 Sep 2023 11:45 )  PTT:138.4 sec    CARDIAC MARKERS ( 21 Sep 2023 05:41 )  x     / x     / x     / 2635 U/L / x     / x      CARDIAC MARKERS ( 20 Sep 2023 03:32 )  x     / x     / x     / 3130 U/L / x     / 6.9 ng/mL  CARDIAC MARKERS ( 19 Sep 2023 13:37 )  133 ng/L / x     / x     / 1670 U/L / x     / 5.4 ng/mL  CARDIAC MARKERS ( 19 Sep 2023 11:00 )  132 ng/L / x     / x     / 1485 U/L / x     / 5.4 ng/mL      Thyroid Stimulating Hormone, Serum: 0.65 uIU/mL (09-19 @ 11:00)

## 2023-09-21 NOTE — PROGRESS NOTE ADULT - PROBLEM SELECTOR PLAN 4
SBP slightly elevated, but essentially at goal for this patient's age.  Unclear what medications he takes at home.   - would C/W home meds, but pt's pharmacy has nothing picked up since January 01/2023. Tro elevated but flat. ECG non-ishemic (no prior to compare). Unclear if related to elevated creatinine vs supply-demand mismatch ico COVID-19 & ?New afib. Unlikely ACS.  - Trop 132->133 flat  - ECG's nonischemic  - Tele improved

## 2023-09-21 NOTE — PROGRESS NOTE ADULT - ATTENDING COMMENTS
patient seen and examined   labs and vitals reviewed  agree with above assessment and plan   multiple medical issues at baseline, now with covid  suspect syncope from covid vs AS  afib on tele, ?new  currently rate controlled  cont toprol  agree with ac  in setting of elevated chadsvasc, if not contraindicated  tte showing LV EF 45% (pt with known hx of CAD) mod MR and possible mod to severe AS  will need eventual wander  cont tele  cont supportive care of covid per primary team.

## 2023-09-21 NOTE — PROGRESS NOTE ADULT - SUBJECTIVE AND OBJECTIVE BOX
Medicine Progress Note  --------------------  Brandon Connors M.D.  Internal Medicine/Emergency Medicine  PGY-3  --------------------      Patient: EVAN SANCHEZ, MRN: [id number], : 1935  Admitted on 23 for Atrial fibrillation      LANGUAGE - English ]OR[ PI (_): #                ------------------------------------------------------------------------------------------------------------  SUMMARY (from last progress note through 23 @ 07:00):   -  ------------------------------------------------------------------------------------------------------------  OVERNIGHT EVENTS  NAEON    SUBJECTIVE  -       ROS negative unless noted above.  ------------------------------------------------------------------------------------------------------------  OBJECTIVE:  Physical Exam  CONST:     NAD, well-appearing; well-developed; appears stated age  EYES:         Conjunctiva clear; PERRL; no conjunctival pallor; no lid lag  ENMT:       MMM, no pharyngeal injection or exudates; normal dentition/dentures present  NECK:        Supple, no LAD; no palpable masses; no thyromegaly  RESP :        Normal respiratory effort; CTA bilaterally; no W/R/R  CHEST:       No TTP, no lines/ports; symmetric chest expansion  CARDIO:     RRR, normal S1 and S2, no M/R/G; apical impulse at MCL  VASC:         No JVD/AJR, no peripheral edema, pulses 2+ B/L, Cap refill <2s  ABD:           Soft, NT/ND, norm bowel sounds; no R/G; No HSM; No CVAT  MSK:          No clubbing of digits; no joint swelling or TTP  EXT:           WWP, no reduction in body hair, no LE skin changes  PSYCH        A&O to person, place, and time; affect appropriate  NEURO:     Non-focal; no gross sensory deficits; moving all extremities   SKIN:          No rashes; no palpable lesions; axillae not dry    Vital Signs Last 24 Hrs  T(F): 98.8, Max: 99.8 (23 @ 13:19)  HR: 85 (85 - 98)  BP: 118/66 (116/65 - 166/91)  RR: 18 (16 - 18)  SpO2: 99% (91% - 99%)        DAILY MEASUREMENTS:  I&O's Summary    20 Sep 2023 07:01  -  21 Sep 2023 07:00  --------------------------------------------------------  IN: 350 mL / OUT: 0 mL / NET: 350 mL      Daily     Daily   Weight (kg): 75.2 (23 @ 19:35)  Orthostatic VS        LABS:                        13.2   8.44  )-----------( 190      ( 20 Sep 2023 03:32 )             39.0     Hgb Trend: 13.2<--, 13.1<--      140  |  106  |  26<H>  ----------------------------<  139<H>  3.8   |  17<L>  |  1.29    Ca    8.0<L>      20 Sep 2023 03:32  Phos  3.5     09-20  Mg     2.10     09-20    TPro  6.4  /  Alb  3.6  /  TBili  0.4  /  DBili  x   /  AST  97<H>  /  ALT  41  /  AlkPhos  53  09-20    PTT - ( 20 Sep 2023 11:45 )  PTT:138.4 sec  CAPILLARY BLOOD GLUCOSE        CARDIAC MARKERS ( 20 Sep 2023 03:32 )  x     / x     / 3130 U/L / x     / 6.9 ng/mL  CARDIAC MARKERS ( 19 Sep 2023 13:37 )  x     / x     / 1670 U/L / x     / 5.4 ng/mL  CARDIAC MARKERS ( 19 Sep 2023 11:00 )  x     / x     / 1485 U/L / x     / 5.4 ng/mL      Urinalysis Basic - ( 20 Sep 2023 03:32 )    Color: x / Appearance: x / SG: x / pH: x  Gluc: 139 mg/dL / Ketone: x  / Bili: x / Urobili: x   Blood: x / Protein: x / Nitrite: x   Leuk Esterase: x / RBC: x / WBC x   Sq Epi: x / Non Sq Epi: x / Bacteria: x              RADIOLOGY & ADDITIONAL TESTS:  Results Reviewed:     Imaging Reviewed:  Electrocardiogram Reviewed:      ------------------------------------------------------------------------------------------------------------  MEDICATIONS  (STANDING):  apixaban 5 milliGRAM(s) Oral two times a day  clopidogrel Tablet 75 milliGRAM(s) Oral daily  lactated ringers. 1000 milliLiter(s) (100 mL/Hr) IV Continuous <Continuous>  metoprolol succinate ER 25 milliGRAM(s) Oral daily  remdesivir  IVPB 100 milliGRAM(s) IV Intermittent every 24 hours  remdesivir  IVPB   IV Intermittent   tamsulosin 0.4 milliGRAM(s) Oral at bedtime    MEDICATIONS  (PRN):    ------------------------------------------------------------------------------------------------------------  COORDINATION OF CARE:  Care discussed with consultants/other providers and notes reviewed [Y]     Medicine Progress Note  --------------------  Brandon Connors M.D.  Internal Medicine/Emergency Medicine  PGY-3  --------------------      Patient: EVAN SANCHEZ, MRN: [id number], : 1935  Admitted on 23 for Atrial fibrillation      OVERNIGHT EVENTS  NAEON    SUBJECTIVE  Mr. Sanchez says that he is feeling a little less weak today. He denies any CP/SOB/nausea/vomiting. He says that he is urinating well still.      ROS negative unless noted above.  ------------------------------------------------------------------------------------------------------------  OBJECTIVE:  Physical Exam  GEN: Awake, AOx3, NAD.  HEENT: NCAT  CARDIO: RRR. Normal S1/S2, no m/r/g. No JVD.  RESP: CTAB, no w/r/r  ABD: Soft, NTND.   MSK: No obvious deformity or ROM deficit.  SKIN: Warm, dry. No rashes. Nail beds without cyanosis or clubbing.  NEURO: Moves all four extremities spontaneously; mild pill-rolling tremor throughout  PSYCH: Appropriate mood & affect.      Vital Signs Last 24 Hrs  T(F): 98.8, Max: 99.8 (23 @ 13:19)  HR: 85 (85 - 98)  BP: 118/66 (116/65 - 166/91)  RR: 18 (16 - 18)  SpO2: 99% (91% - 99%)        DAILY MEASUREMENTS:  I&O's Summary    20 Sep 2023 07:01  -  21 Sep 2023 07:00  --------------------------------------------------------  IN: 350 mL / OUT: 0 mL / NET: 350 mL      Daily     Daily   Weight (kg): 75.2 (23 @ 19:35)  Orthostatic VS        LABS:                        13.2   8.44  )-----------( 190      ( 20 Sep 2023 03:32 )             39.0     Hgb Trend: 13.2<--, 13.1<--      140  |  106  |  26<H>  ----------------------------<  139<H>  3.8   |  17<L>  |  1.29    Ca    8.0<L>      20 Sep 2023 03:32  Phos  3.5       Mg     2.10         TPro  6.4  /  Alb  3.6  /  TBili  0.4  /  DBili  x   /  AST  97<H>  /  ALT  41  /  AlkPhos  53  20    PTT - ( 20 Sep 2023 11:45 )  PTT:138.4 sec  CAPILLARY BLOOD GLUCOSE        CARDIAC MARKERS ( 20 Sep 2023 03:32 )  x     / x     / 3130 U/L / x     / 6.9 ng/mL  CARDIAC MARKERS ( 19 Sep 2023 13:37 )  x     / x     / 1670 U/L / x     / 5.4 ng/mL  CARDIAC MARKERS ( 19 Sep 2023 11:00 )  x     / x     / 1485 U/L / x     / 5.4 ng/mL      Urinalysis Basic - ( 20 Sep 2023 03:32 )    Color: x / Appearance: x / SG: x / pH: x  Gluc: 139 mg/dL / Ketone: x  / Bili: x / Urobili: x   Blood: x / Protein: x / Nitrite: x   Leuk Esterase: x / RBC: x / WBC x   Sq Epi: x / Non Sq Epi: x / Bacteria: x              RADIOLOGY & ADDITIONAL TESTS:  Results Reviewed:   CK     Imaging Reviewed:  < from: TTE W or WO Ultrasound Enhancing Agent (23 @ 10:25) >  CONCLUSIONS:      1. Left ventricular systolic function is moderately decreased with an ejection fraction visually estimated at 40 to 45 %. Left ventricular regional wall motion assessment revealshypokinesis of the apical and apical septal walls.   2. Right ventricular cavity is normal in size.   3. There is calcification of the mitral valve annulus.   4. Mild mitral valve leaflet calcification.   5. There is mild mitral valve stenosis.   6. Moderate mitral regurgitation.   7. Aortic valve was not well visualized. The aortic valve was not well visualized. The aortic valve appears trileaflet with reduced systolic excursion. There is calcification of the aortic valve leaflets. There is probably significant aortic stenosis. There is mild aortic regurgitation.    < end of copied text >    Electrocardiogram Reviewed:  Tele reviewed, rate-controlled AFib        ------------------------------------------------------------------------------------------------------------  MEDICATIONS  (STANDING):  apixaban 5 milliGRAM(s) Oral two times a day  clopidogrel Tablet 75 milliGRAM(s) Oral daily  lactated ringers. 1000 milliLiter(s) (100 mL/Hr) IV Continuous <Continuous>  metoprolol succinate ER 25 milliGRAM(s) Oral daily  remdesivir  IVPB 100 milliGRAM(s) IV Intermittent every 24 hours  remdesivir  IVPB   IV Intermittent   tamsulosin 0.4 milliGRAM(s) Oral at bedtime    MEDICATIONS  (PRN):    ------------------------------------------------------------------------------------------------------------  COORDINATION OF CARE:  Care discussed with consultants/other providers and notes reviewed [Y]
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
## 2023-09-21 NOTE — PROGRESS NOTE ADULT - PROBLEM SELECTOR PLAN 10
Pt with elevated creatinine to 1.35. Unclear if FUAD or age-related elevated creatinine.  No significant electrolyte abnormalities or c/f alarming CKD that would require more urgent intervention.  - Trend BMPs  - avoid nephrotoxic agents Pt with h/o BPH, on Silodosin. Tadalafil/Sildenafil?   - D/C Tadalafil, Sildenafil on DC  - C/W Tamsulosni 0.4mg PO QHS

## 2023-09-21 NOTE — PROGRESS NOTE ADULT - PROBLEM SELECTOR PLAN 5
pain, ankle Unclear diabetic history. No a1c in system  - A1c check  - Start consistent carb diet  - May need basal/bolus, but would await A1c Unclear diabetic history. No a1c in system.  - A1c check  - Start consistent carb diet  - May need basal/bolus, but would await A1c SBP at goal last 3 checks. Had been elevated.  - Would favor ACEi/ARB ico HFrEF concern & would also be nephroprotective, but pt w/ elevated creatinine (unclear baseline), so would not initiate this now  - c/w BB as per AFib

## 2023-09-22 ENCOUNTER — TRANSCRIPTION ENCOUNTER (OUTPATIENT)
Age: 88
End: 2023-09-22

## 2023-09-22 LAB
ANION GAP SERPL CALC-SCNC: 9 MMOL/L — SIGNIFICANT CHANGE UP (ref 7–14)
BASOPHILS # BLD AUTO: 0.02 K/UL — SIGNIFICANT CHANGE UP (ref 0–0.2)
BASOPHILS NFR BLD AUTO: 0.4 % — SIGNIFICANT CHANGE UP (ref 0–2)
BUN SERPL-MCNC: 34 MG/DL — HIGH (ref 7–23)
CALCIUM SERPL-MCNC: 8.3 MG/DL — LOW (ref 8.4–10.5)
CHLORIDE SERPL-SCNC: 107 MMOL/L — SIGNIFICANT CHANGE UP (ref 98–107)
CK SERPL-CCNC: 1880 U/L — HIGH (ref 30–200)
CO2 SERPL-SCNC: 22 MMOL/L — SIGNIFICANT CHANGE UP (ref 22–31)
CREAT SERPL-MCNC: 1.26 MG/DL — SIGNIFICANT CHANGE UP (ref 0.5–1.3)
EGFR: 55 ML/MIN/1.73M2 — LOW
EOSINOPHIL # BLD AUTO: 0.1 K/UL — SIGNIFICANT CHANGE UP (ref 0–0.5)
EOSINOPHIL NFR BLD AUTO: 2 % — SIGNIFICANT CHANGE UP (ref 0–6)
GLUCOSE SERPL-MCNC: 103 MG/DL — HIGH (ref 70–99)
HCT VFR BLD CALC: 37.8 % — LOW (ref 39–50)
HGB BLD-MCNC: 12.6 G/DL — LOW (ref 13–17)
IANC: 1.53 K/UL — LOW (ref 1.8–7.4)
IMM GRANULOCYTES NFR BLD AUTO: 0.4 % — SIGNIFICANT CHANGE UP (ref 0–0.9)
LYMPHOCYTES # BLD AUTO: 2.7 K/UL — SIGNIFICANT CHANGE UP (ref 1–3.3)
LYMPHOCYTES # BLD AUTO: 55.1 % — HIGH (ref 13–44)
MAGNESIUM SERPL-MCNC: 2.1 MG/DL — SIGNIFICANT CHANGE UP (ref 1.6–2.6)
MCHC RBC-ENTMCNC: 31 PG — SIGNIFICANT CHANGE UP (ref 27–34)
MCHC RBC-ENTMCNC: 33.3 GM/DL — SIGNIFICANT CHANGE UP (ref 32–36)
MCV RBC AUTO: 93.1 FL — SIGNIFICANT CHANGE UP (ref 80–100)
MONOCYTES # BLD AUTO: 0.53 K/UL — SIGNIFICANT CHANGE UP (ref 0–0.9)
MONOCYTES NFR BLD AUTO: 10.8 % — SIGNIFICANT CHANGE UP (ref 2–14)
NEUTROPHILS # BLD AUTO: 1.53 K/UL — LOW (ref 1.8–7.4)
NEUTROPHILS NFR BLD AUTO: 31.3 % — LOW (ref 43–77)
NRBC # BLD: 0 /100 WBCS — SIGNIFICANT CHANGE UP (ref 0–0)
NRBC # FLD: 0 K/UL — SIGNIFICANT CHANGE UP (ref 0–0)
PHOSPHATE SERPL-MCNC: 3.5 MG/DL — SIGNIFICANT CHANGE UP (ref 2.5–4.5)
PLATELET # BLD AUTO: 168 K/UL — SIGNIFICANT CHANGE UP (ref 150–400)
POTASSIUM SERPL-MCNC: 3.9 MMOL/L — SIGNIFICANT CHANGE UP (ref 3.5–5.3)
POTASSIUM SERPL-SCNC: 3.9 MMOL/L — SIGNIFICANT CHANGE UP (ref 3.5–5.3)
RBC # BLD: 4.06 M/UL — LOW (ref 4.2–5.8)
RBC # FLD: 14.1 % — SIGNIFICANT CHANGE UP (ref 10.3–14.5)
SODIUM SERPL-SCNC: 138 MMOL/L — SIGNIFICANT CHANGE UP (ref 135–145)
WBC # BLD: 4.9 K/UL — SIGNIFICANT CHANGE UP (ref 3.8–10.5)
WBC # FLD AUTO: 4.9 K/UL — SIGNIFICANT CHANGE UP (ref 3.8–10.5)

## 2023-09-22 PROCEDURE — 71045 X-RAY EXAM CHEST 1 VIEW: CPT | Mod: 26

## 2023-09-22 PROCEDURE — 99233 SBSQ HOSP IP/OBS HIGH 50: CPT | Mod: GC

## 2023-09-22 PROCEDURE — 99233 SBSQ HOSP IP/OBS HIGH 50: CPT

## 2023-09-22 PROCEDURE — 93010 ELECTROCARDIOGRAM REPORT: CPT | Mod: 76

## 2023-09-22 RX ORDER — POTASSIUM CHLORIDE 20 MEQ
10 PACKET (EA) ORAL ONCE
Refills: 0 | Status: COMPLETED | OUTPATIENT
Start: 2023-09-22 | End: 2023-09-22

## 2023-09-22 RX ADMIN — TAMSULOSIN HYDROCHLORIDE 0.4 MILLIGRAM(S): 0.4 CAPSULE ORAL at 21:20

## 2023-09-22 RX ADMIN — CLOPIDOGREL BISULFATE 75 MILLIGRAM(S): 75 TABLET, FILM COATED ORAL at 16:17

## 2023-09-22 RX ADMIN — Medication 10 MILLIEQUIVALENT(S): at 16:17

## 2023-09-22 RX ADMIN — Medication 25 MILLIGRAM(S): at 05:54

## 2023-09-22 RX ADMIN — APIXABAN 5 MILLIGRAM(S): 2.5 TABLET, FILM COATED ORAL at 05:54

## 2023-09-22 RX ADMIN — APIXABAN 5 MILLIGRAM(S): 2.5 TABLET, FILM COATED ORAL at 16:17

## 2023-09-22 NOTE — CHART NOTE - NSCHARTNOTESELECT_GEN_ALL_CORE
Dr. Mabel Ramirez with patient and family discussing results and plan of care.      Sg Bar RN  10/13/18 [id number]
Event Note
Hospitalist/Event Note
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
## 2023-09-22 NOTE — CHART NOTE - NSCHARTNOTEFT_GEN_A_CORE
Notified by RN that patient w/ arrythmia on tele, concern for first deg HB. Patient asymptomatic, vitals stable. EKG performed. EKG and tele reviewed showing artrial arrythmia 78bpm, . EKG unchanged from previous. Will continue to monitor on tele.    Vital Signs Last 24 Hrs  T(C): 36.7 (22 Sep 2023 21:56), Max: 36.7 (22 Sep 2023 21:56)  T(F): 98.1 (22 Sep 2023 21:56), Max: 98.1 (22 Sep 2023 21:56)  HR: 79 (22 Sep 2023 21:56) (79 - 88)  BP: 107/79 (22 Sep 2023 21:56) (107/79 - 130/85)  BP(mean): --  RR: 18 (22 Sep 2023 21:56) (18 - 19)  SpO2: 99% (22 Sep 2023 21:56) (93% - 99%)    Parameters below as of 22 Sep 2023 21:56  Patient On (Oxygen Delivery Method): room air Notified by RN that patient w/ arrythmia on tele, concern for 2nd deg HB. Patient asymptomatic, vitals stable. EKG performed. EKG and tele reviewed showing artrial arrythmia 78bpm, . EKG unchanged from previous. Will continue to monitor on tele.    Vital Signs Last 24 Hrs  T(C): 36.7 (22 Sep 2023 21:56), Max: 36.7 (22 Sep 2023 21:56)  T(F): 98.1 (22 Sep 2023 21:56), Max: 98.1 (22 Sep 2023 21:56)  HR: 79 (22 Sep 2023 21:56) (79 - 88)  BP: 107/79 (22 Sep 2023 21:56) (107/79 - 130/85)  BP(mean): --  RR: 18 (22 Sep 2023 21:56) (18 - 19)  SpO2: 99% (22 Sep 2023 21:56) (93% - 99%)    Parameters below as of 22 Sep 2023 21:56  Patient On (Oxygen Delivery Method): room air

## 2023-09-22 NOTE — PROGRESS NOTE ADULT - PROBLEM SELECTOR PLAN 3
#COVID+ w/ Viral Syndrome  [ ] CXR (9/22) given SOB and crackles in L lung   - dx at , symptomatic since 9/18, productive cough w/ sore throat and subjective fever   > s/p  remdesivir for 3 day course (9/19-9/21)

## 2023-09-22 NOTE — PROGRESS NOTE ADULT - PROBLEM SELECTOR PLAN 2
TTE with EF 40-45% and apical WMA. May be related to pt's CAD s/p CABG, MI in the past though no other TTE in our system. Valve concerns noted, incl. poorly visualized but possibly concerning Aortic Valve. Given pt's improvement with BB & IVF with time course involving COVID, suspect near-syncope less likely cardiogenic / less likely related to aortic valve. Decreased inotropy would presumably decrease abilty to compensate for severe AS & he would be worse rather than better.  Diagnostics  [ ] ROHITH per Cards for AS eval early next week     Therapeutics   >> Preload: IVF for rhabdo   >> Afterload: SBP at goal, no more anti-HTN   >> Diuretics: HOLDing Home Furosemide 20mg PO QD   >> BB: C/W Metop succ ER 25mg PO QD   >> ACEi/ARB/ANRI: N/A ico creatinine elevation (though ?CKD)   >> SGLT2i: N/A ico creatinine elevation  - C/W home clopidogrel 75mg QD  - HOLDing Home Atorvastatin 40mg PO QHS

## 2023-09-22 NOTE — DISCHARGE NOTE PROVIDER - HOSPITAL COURSE
88yom w/ hx significant for CVA (w/o residual deficit), CAD s/p CABG w/ MI (2021), parkinson's, pre-DM, HTN presents for generalized weakness for one day, found to be COVID+ at  prior to presenting to ED, when he was found to be in new-onset a-fib. Exam w/o focal neurological deficit. CTH no acute change but presence of a meningioma. Generalized weakness likely from a fib vs viral syndrome. Now s/p remdesivir x3 days with improvement in symptoms, no oxygen requirement. TTE showed EF 40-45% w/ poss. severe AS pending further ROHITH eval earlier next week.     IMPORTANT MEDICATION CHANGES:     START TAKING:    CONTINUE TAKING:     STOP TAKING:         IMPORTANT FOLLOW UPs:     (Pt lives in [place], [place] with wife)    Neurosurgery for poss. meningioma on CTH   Neurology for PD (symptomatic but not on any home med)  Cardiology for a fib, ?AS and HFrEF (40-45%) 88M with h/o HTN, HLD c/b CAD s/p CABG, MI (2021), Parkinson's, CVA (no residual deficits), ?PreDM who presents with generalized weakness and found to be COVID-19+ and coarse AFib (new for pt).     ·  Problem: Atrial fibrillation.   ·  Plan: AFib on admission, new onset   [id number]  -EKG: a fib w/ R bundle branch block   - C/W Apixaban 5mg BID   - C/W Metoprolol succinate ER 25mg QD    ·  Problem: HFrEF (heart failure with reduced ejection fraction).   ·  Plan: -Acute HFrEF  -TTE with EF 40-45% and apical WMA. Poorly visualized Aortic Valve but findings concerning for significant AS   -Continue Toprol ER 25mg PO QD  -Holding home Furosemide 20mg PO QD, can resume on discharge   -defer ROHITH at this time d/t COVID+   -Cardiology f/up.    ·  Problem: Syncope.   ·  Plan: Suspect syncope from covid and less likely AS  -Trop 132->133   -s/p telemetry monitoring   -TTE as above   -defer ROHITH at this time d/t COVID+.    ·  Problem: CAD (coronary artery disease).   ·  Plan: - C/W home clopidogrel 75mg QD  - CPK improved, restart Atorvastatin 40mg PO QHS.    ·  Problem: 2019 novel coronavirus disease (COVID-19).   ·  Plan: COVID+ w/ Viral Syndrome  CXR (9/22) w/ clear lungs   s/p Remdesivir for 3 day course (9/19-9/21)    ·  Problem: Elevated troponin level.   ·  Plan: Trop elevated but flat. ECG non-ishemic (no prior to compare). Unclear if related to elevated creatinine vs supply-demand mismatch ico COVID-19 & ?New afib. Unlikely ACS.  - Trop 132->133 flat  - ECG's nonischemic    ·  Problem: Rhabdomyolysis.   ·  Plan: Pt with CK > 1.5k, down-trended    - s/p gentle IVF   - Restart statin.    ·  Problem: HTN (hypertension).   ·  Plan: - c/w Toprol   - monitor BP.    ·  Problem: Meningioma.   ·  Plan; Incidentally noted calcified lesion in anterior/frontal cranium, likely meningioma. No focal neuro deficits.  - Will report in discharge summary for OP follow-up  - No urgent intervention required.    ·  Problem: Parkinsons disease.   ·  Plan: Pt with pill-rolling tremor   - not on any home meds, f/u O/P.    ·  Problem: BPH (benign prostatic hyperplasia).   ·  Plan: Pt with h/o BPH  - D/C Tadalafil, Sildenafil on DC  - C/W Tamsulosin 0.4mg PO QHS.
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
## 2023-09-22 NOTE — PROGRESS NOTE ADULT - SUBJECTIVE AND OBJECTIVE BOX
Medicine Progress Note  --------------------  Alpa Milian M.D.  Internal Medicine/Emergency Medicine  PGY-1  --------------------      Patient: EVAN SANCHEZ, MRN: [id number], : 1935  Admitted on 23 for Atrial fibrillation      LANGUAGE - English       ------------------------------------------------------------------------------------------------------------  SUMMARY (from last progress note through 23 @ 05:36):   -  ------------------------------------------------------------------------------------------------------------  OVERNIGHT EVENTS  NAEON    SUBJECTIVE  -       ROS negative unless noted above.  ------------------------------------------------------------------------------------------------------------  OBJECTIVE:  Physical Exam  GEN: Awake, AOx3, NAD.  HEENT: NCAT  CARDIO: RRR. Normal S1/S2, no m/r/g. No JVD.  RESP: CTAB, no w/r/r  ABD: Soft, NTND.   MSK: No obvious deformity or ROM deficit.  SKIN: Warm, dry. No rashes. Nail beds without cyanosis or clubbing.  NEURO: Moves all four extremities spontaneously; mild pill-rolling tremor throughout  PSYCH: Appropriate mood & affect.    Vital Signs Last 24 Hrs  T(F): 98.1, Max: 98.1 (23 @ 22:04)  HR: 86 (86 - 93)  BP: 121/76 (119/62 - 121/76)  RR: 18 (18 - 18)  SpO2: 99% (94% - 99%)        DAILY MEASUREMENTS:  I&O's Summary    20 Sep 2023 07:  -  21 Sep 2023 07:00  --------------------------------------------------------  IN: 350 mL / OUT: 0 mL / NET: 350 mL    21 Sep 2023 07:  -  22 Sep 2023 05:36  --------------------------------------------------------  IN: 200 mL / OUT: 0 mL / NET: 200 mL      Daily     Daily   Weight (kg): 75.2 (23 @ 19:35)  Orthostatic VS        LABS:                        12.3   5.82  )-----------( 153      ( 21 Sep 2023 05:41 )             36.8     Hgb Trend: 12.3<--, 13.2<--, 13.1<--      139  |  106  |  33<H>  ----------------------------<  108<H>  3.6   |  22  |  1.39<H>    Ca    7.9<L>      21 Sep 2023 05:41  Phos  3.5       Mg     2.20         TPro  5.8<L>  /  Alb  3.1<L>  /  TBili  0.4  /  DBili  x   /  AST  102<H>  /  ALT  37  /  AlkPhos  45  09-21    PTT - ( 20 Sep 2023 11:45 )  PTT:138.4 sec  CAPILLARY BLOOD GLUCOSE        CARDIAC MARKERS ( 21 Sep 2023 05:41 )  x     / x     / 2635 U/L / x     / x          Urinalysis Basic - ( 21 Sep 2023 05:41 )    Color: x / Appearance: x / SG: x / pH: x  Gluc: 108 mg/dL / Ketone: x  / Bili: x / Urobili: x   Blood: x / Protein: x / Nitrite: x   Leuk Esterase: x / RBC: x / WBC x   Sq Epi: x / Non Sq Epi: x / Bacteria: x              RADIOLOGY & ADDITIONAL TESTS:  Results Reviewed:     Imaging Reviewed:  Electrocardiogram Reviewed:      ------------------------------------------------------------------------------------------------------------  MEDICATIONS  (STANDING):  apixaban 5 milliGRAM(s) Oral two times a day  clopidogrel Tablet 75 milliGRAM(s) Oral daily  lactated ringers. 1000 milliLiter(s) (100 mL/Hr) IV Continuous <Continuous>  metoprolol succinate ER 25 milliGRAM(s) Oral daily  tamsulosin 0.4 milliGRAM(s) Oral at bedtime    MEDICATIONS  (PRN):    ------------------------------------------------------------------------------------------------------------  COORDINATION OF CARE:  Care discussed with consultants/other providers and notes reviewed [Y]     Medicine Progress Note  --------------------  Alpa Milian M.D.  Internal Medicine/Emergency Medicine  PGY-1  --------------------      Patient: EVAN SANCHEZ, MRN: [id number], : 1935  Admitted on 23 for Atrial fibrillation      LANGUAGE - English       ------------------------------------------------------------------------------------------------------------  SUMMARY (from last progress note through 23 @ 05:36):   -  ------------------------------------------------------------------------------------------------------------  OVERNIGHT EVENTS  SUKHI MULLINS  - pt seen lying on bed this AM. States he feel everything is getting "adan" and he was feeling stronger (able to turn in bed and walk better) until he experienced chest pressure and SOB 5h ago which woke him up from sleep. Denies CP, n/v now.       ROS negative unless noted above.  ------------------------------------------------------------------------------------------------------------  OBJECTIVE:  Physical Exam  GEN: Awake, AOx3, NAD.  HEENT: NCAT  CARDIO: RRR. Normal S1/S2, no m/r/g. No JVD.  RESP: CTAB, no w/r/r  ABD: Soft, NTND.   MSK: No obvious deformity or ROM deficit.  EXT: mild pitting edema b/l, more on L than R, no calf tenderness   SKIN: Warm, dry. No rashes. Nail beds without cyanosis or clubbing.  NEURO: Moves all four extremities spontaneously; mild pill-rolling tremor throughout  PSYCH: Appropriate mood & affect.    Vital Signs Last 24 Hrs  T(F): 98.1, Max: 98.1 (23 @ 22:04)  HR: 86 (86 - 93)  BP: 121/76 (119/62 - 121/76)  RR: 18 (18 - 18)  SpO2: 99% (94% - 99%)        DAILY MEASUREMENTS:  I&O's Summary    20 Sep 2023 07:  -  21 Sep 2023 07:00  --------------------------------------------------------  IN: 350 mL / OUT: 0 mL / NET: 350 mL    21 Sep 2023 07:  -  22 Sep 2023 05:36  --------------------------------------------------------  IN: 200 mL / OUT: 0 mL / NET: 200 mL      Daily     Daily   Weight (kg): 75.2 (23 @ 19:35)  Orthostatic VS        LABS:                        12.3   5.82  )-----------( 153      ( 21 Sep 2023 05:41 )             36.8     Hgb Trend: 12.3<--, 13.2<--, 13.1<--      139  |  106  |  33<H>  ----------------------------<  108<H>  3.6   |  22  |  1.39<H>    Ca    7.9<L>      21 Sep 2023 05:41  Phos  3.5       Mg     2.20         TPro  5.8<L>  /  Alb  3.1<L>  /  TBili  0.4  /  DBili  x   /  AST  102<H>  /  ALT  37  /  AlkPhos  45      PTT - ( 20 Sep 2023 11:45 )  PTT:138.4 sec  CAPILLARY BLOOD GLUCOSE        CARDIAC MARKERS ( 21 Sep 2023 05:41 )  x     / x     / 2635 U/L / x     / x          Urinalysis Basic - ( 21 Sep 2023 05:41 )    Color: x / Appearance: x / SG: x / pH: x  Gluc: 108 mg/dL / Ketone: x  / Bili: x / Urobili: x   Blood: x / Protein: x / Nitrite: x   Leuk Esterase: x / RBC: x / WBC x   Sq Epi: x / Non Sq Epi: x / Bacteria: x              RADIOLOGY & ADDITIONAL TESTS:  Results Reviewed:     Imaging Reviewed:  Electrocardiogram Reviewed:      ------------------------------------------------------------------------------------------------------------  MEDICATIONS  (STANDING):  apixaban 5 milliGRAM(s) Oral two times a day  clopidogrel Tablet 75 milliGRAM(s) Oral daily  lactated ringers. 1000 milliLiter(s) (100 mL/Hr) IV Continuous <Continuous>  metoprolol succinate ER 25 milliGRAM(s) Oral daily  tamsulosin 0.4 milliGRAM(s) Oral at bedtime    MEDICATIONS  (PRN):    ------------------------------------------------------------------------------------------------------------  COORDINATION OF CARE:  Care discussed with consultants/other providers and notes reviewed [Y]

## 2023-09-22 NOTE — PROGRESS NOTE ADULT - PROBLEM SELECTOR PLAN 4
Tro elevated but flat. ECG non-ishemic (no prior to compare). Unclear if related to elevated creatinine vs supply-demand mismatch ico COVID-19 & ?New afib. Unlikely ACS.  - Trop 132->133 flat  - ECG's nonischemic  - Tele improved

## 2023-09-22 NOTE — MEDICAL STUDENT PROGRESS NOTE(EDUCATION) - PLAN 1
EKG showed no significant changes. LE strength bilaterally has improved.    As per cards, plan to place on telemetry, c/w Eliquis, Plavix, Toprol. Statin held until CK normalizes. ROHITH planned to be performed while on Eliquis to assess aortic stenosis, as per cards recommendation.

## 2023-09-22 NOTE — DISCHARGE NOTE PROVIDER - NSDCCPCAREPLAN_GEN_ALL_CORE_FT
PRINCIPAL DISCHARGE DIAGNOSIS  Diagnosis: New onset atrial fibrillation  Assessment and Plan of Treatment:       SECONDARY DISCHARGE DIAGNOSES  Diagnosis: COVID  Assessment and Plan of Treatment:     Diagnosis: Weakness  Assessment and Plan of Treatment:      PRINCIPAL DISCHARGE DIAGNOSIS  Diagnosis: New onset atrial fibrillation  Assessment and Plan of Treatment: Continue Torpol for rate control. Continue Eliquis for stroke prevention. Follow up with Cardiology.      SECONDARY DISCHARGE DIAGNOSES  Diagnosis: HFrEF (heart failure with reduced ejection fraction)  Assessment and Plan of Treatment: Continue Toprol. Resume Lasix. Follow up with Cardiology.    Diagnosis: COVID  Assessment and Plan of Treatment: You completed 3 days of Remdesivir. Follow up with PCP.    Diagnosis: CAD (coronary artery disease)  Assessment and Plan of Treatment: Continue Plavix and resume Atorvastatin. Follow up with Cardiology.    Diagnosis: Meningioma  Assessment and Plan of Treatment: Incidentally noted calcified lesion in anterior/frontal cranium, likely meningioma. Follow up with neurology as outpatient for monitoring.    Diagnosis: BPH (benign prostatic hyperplasia)  Assessment and Plan of Treatment: Continue Flomax.    Diagnosis: Parkinsons disease  Assessment and Plan of Treatment: Follow up with neurology as outpatient.

## 2023-09-22 NOTE — PROGRESS NOTE ADULT - PROBLEM SELECTOR PLAN 5
SBP at goal last 3 checks. Had been elevated.  - Would favor ACEi/ARB ico HFrEF concern & would also be nephroprotective, but pt w/ elevated creatinine (unclear baseline), so would not initiate this now  - c/w BB as per AFib

## 2023-09-22 NOTE — MEDICAL STUDENT PROGRESS NOTE(EDUCATION) - SUBJECTIVE AND OBJECTIVE BOX
Overnight events:  D/c telemetry    Subjective:  Patient reports feeling a little worse and SOB this morning. SOB started a couple hours before 9 AM. Patient reports feeling some pressure but no pain on chest. Patient also reports improved LE strength bilaterally.    Objective:  Afebrile (36.5), HR 79, normotensive (118/65), RR 19, O2 sat 99% on room air    PE:  -General: well appearing, nontoxic, cooperative, NAD  -Pulm: normal respiratory effort  -Cardiac: normal rate, irregularly irregular rhythm  -Ext: 5/5 strength in all extremities    Labs:  H+H stable at 12.6/37.8%  BUN stable at 34 with normalization of SCr (1.26 from 1.39)  eGFR continues to be low at 55  CK peak of 3130 at 9/20/23, down to 1880 today    Imaging:  EKG reviewed with no significant changes

## 2023-09-22 NOTE — PROGRESS NOTE ADULT - PROBLEM SELECTOR PLAN 1
AFib on admission, never documented by PCP, trigger likely multifactorial. (COVID & enlarged LA ico HFrEF). [id number]  Diagnotics:   [x] Trop 132>133 [H], CKMB 5.4 [WNL]   [x] EKG: a fib w/ R bundle branch block   [x] CXR clear; CTH no acute change, chronic L parietal infart, extraaxial lesoin L lateral frontal region likely meningioma   [x] TTE: LV systolic fn moderately decreased w/ an EF 40 to 45 %. Hypokinesis of the apical and apical septal walls.  [ ] ROHITH: plan for early next week (can be on DOAC)   Therapeutics   - C/W Apixaban 5mg BID   - C/W Home Clopidogrel 75mg QD  - C/W Metoprolol succinate ER ("Toprol") 25mg QD  - C/W Tele monitoring

## 2023-09-22 NOTE — MEDICAL STUDENT PROGRESS NOTE(EDUCATION) - PLAN 2
Patient completed his remdesivir course yesterday and is now c/o mild SOB that started this morning. Satting at 99% on room air.    CXR ordered to assess for pulmonary abnormalities. Left lung effusion expected likely as a result of the fluids administered for rhabdomyosis as well as HFrEF. Will advise to continue IS. Fluids to be held pending CXR results.

## 2023-09-22 NOTE — PROGRESS NOTE ADULT - PROBLEM SELECTOR PLAN 9
Pt with CK > 1.5k poss rhabdo  - s/p gentle IVF given unclear cardiac function & worsening rhabdo  - Holding HOME Atorvstatin d/t risk for rhabdo  - Peaked, c/w IVF, PO ad caroline

## 2023-09-22 NOTE — PROGRESS NOTE ADULT - SUBJECTIVE AND OBJECTIVE BOX
Cardiology Fellow Consult Progress Note    Subjective: no CP or SOB    No acute events overnight. No significant tele events. ROS negative at the bedside.     REVIEW OF SYSTEMS:   All other review of systems is negative unless indicated above.    Physical Exam:  T(F): 97.7 (09-22), Max: 98.1 (09-21)  HR: 79 (09-22) (79 - 93)  BP: 118/65 (09-22) (118/65 - 121/76)  RR: 19 (09-22)  SpO2: 99% (09-22)  GENERAL: No acute distress, well-developed, elderly, hard of hearing  CHEST/LUNG: Clear to auscultation bilaterally  HEART: Irregular rate, +SM at right upper sternal border  ABDOMEN: Soft, Nontender, Nondistended  EXTREMITIES:  No edema  NEUROLOGY: AAOx3    Cardiovascular diagnostic testings: personally reviewed    Imaging diagnostic testings: personally reviewed    Labs: Personally reviewed                        12.3   5.82  )-----------( 153      ( 21 Sep 2023 05:41 )             36.8     09-22    138  |  107  |  34<H>  ----------------------------<  103<H>  3.9   |  22  |  1.26    Ca    8.3<L>      22 Sep 2023 06:00  Phos  3.5     09-22  Mg     2.10     09-22    TPro  5.8<L>  /  Alb  3.1<L>  /  TBili  0.4  /  DBili  x   /  AST  102<H>  /  ALT  37  /  AlkPhos  45  09-21    PTT - ( 20 Sep 2023 11:45 )  PTT:138.4 sec    CARDIAC MARKERS ( 22 Sep 2023 06:00 )  x     / x     / x     / 1880 U/L / x     / x      CARDIAC MARKERS ( 21 Sep 2023 05:41 )  x     / x     / x     / 2635 U/L / x     / x      CARDIAC MARKERS ( 20 Sep 2023 03:32 )  x     / x     / x     / 3130 U/L / x     / 6.9 ng/mL  CARDIAC MARKERS ( 19 Sep 2023 13:37 )  133 ng/L / x     / x     / 1670 U/L / x     / 5.4 ng/mL  CARDIAC MARKERS ( 19 Sep 2023 11:00 )  132 ng/L / x     / x     / 1485 U/L / x     / 5.4 ng/mL        Thyroid Stimulating Hormone, Serum: 0.65 uIU/mL (09-19 @ 11:00)

## 2023-09-22 NOTE — PROGRESS NOTE ADULT - ATTENDING COMMENTS
Patient seen and examined with team in ed  Agree with above a/p by Dr Milian  89 yo M with h/o HTN, DM, CVA, CAD s/p Cabg, Parkinson p/w weakness to ED.   He was Diagnosed with covid 9/18 at urgent care c/o 2 days of sore throat/ chills/ cough and white phegm Slid out of chair 9/18.     PE NAD , SOB  Vital Signs Last 24 Hrs  T(F): 97.1 HR: 88 BP: 130/85 RR: 18 SpO2: 93%room air  Lungs cta with bec bs at L base,  cor irreg, irreg, abd soft n/t, ext neg e/c/c. Neuro- CN 11-xii intact. no focal deficit. Strenth 5 plus b/l            rd< from: CT Head No Cont (09.19.23 @ 11:51) >  IMPRESSION:  No acute intracranial hemorrhage, brain edema, or mass effect.  No displaced calvarial fracture. Chronic left parietal infarct.  1.2 x 0.7 cm calcified extra-axial lesion in the left lateral frontal  region, likely a meningioma.    09-22 creat 1.39---. 1.25, cpk 1816  eho< from: TTE W or WO Ultrasound Enhancing Agent (09.20.23 @ 10:25) >  CONCLUSIONS:   1. Left ventricular systolic function is moderately decreased with an ejection fraction visually estimated at 40 to 45 %. Left ventricular regional wall motion assessment revealshypokinesis of the apical and apical septal walls.   2. Right ventricular cavity is normal in size.   3. There is calcification of the mitral valve annulus.   4. Mild mitral valve leaflet calcification.   5. There is mild mitral valve stenosis.   6. Moderate mitral regurgitation.   7. Aortic valve was not well visualized. The aortic valve was not well visualized. The aortic valve appears trileaflet with reduced systolic excursion. There is calcification of the aortic valve leaflets. There is probably significant aortic stenosis. There is mild aortic regurgitation.    CXR---> P    A/P  # New Afib/ syst CHF w/o exacerbation/ MOD MR and AS, CAD s/p Cabg with Abn EKG  Cardiology appreciated. Transition to Eliquis on 9/20/23, Card wants ROHITH --> ordered. Cardiology notes, Ok to do with ELIQUIS.  # Mild Rhabdo from fall. IVF hydration and monitor cpk 3000---> 1880  IVF on hold. Cxr ord. encourage PO intake. IVL for now.  # Covid positive, oxygen Sat nl . no need for steroids. Remdesivir x 3/ 3 days completed 9/21. Airbron isolation and contact isolation.  # Creat 1.35--> 1.26  # DM fs with Ss coverage. Monitor Hgb aic AM  # Neuro- small meningioma on Ct of head. Observe.  CVA, old stroke seen on CT. observe. Elevated benjie vasc score 7.5. DOAC on d/c.  # dvt proph- now on Eliquis  Pt recommended REHAB  9/21/23 called wife and updated. Planning for REHAB

## 2023-09-22 NOTE — PROGRESS NOTE ADULT - PROBLEM SELECTOR PLAN 10
Pt with h/o BPH, on Silodosin. Tadalafil/Sildenafil?   - D/C Tadalafil, Sildenafil on DC  - C/W Tamsulosni 0.4mg PO QHS
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
## 2023-09-22 NOTE — DISCHARGE NOTE PROVIDER - NSDCMRMEDTOKEN_GEN_ALL_CORE_FT
aspirin 81 mg oral delayed release tablet: 1 tab(s) orally once a day  atorvastatin 40 mg oral tablet: 1 tab(s) orally once a day (at bedtime)  cilostazol 100 mg oral tablet: 1 orally 2 times a day  clopidogrel 75 mg oral tablet: 1 tab(s) orally once a day  fluticasone 50 mcg/inh nasal spray: 2 spray(s) in each nostril once a day  furosemide 20 mg oral tablet: 1 tab(s) orally once a day  metoprolol tartrate 25 mg oral tablet: 1 tab(s) orally once a day  montelukast 10 mg oral tablet: 1 tab(s) orally once a day (at bedtime)  silodosin 8 mg oral capsule: 1 cap(s) orally once a day (at bedtime)  tadalafil 20 mg oral tablet: 1 tab(s) orally once a day &quot;as directed&quot;   atorvastatin 40 mg oral tablet: 1 tab(s) orally once a day (at bedtime)  clopidogrel 75 mg oral tablet: 1 tab(s) orally once a day  fluticasone 50 mcg/inh nasal spray: 2 spray(s) in each nostril once a day  furosemide 20 mg oral tablet: 1 tab(s) orally once a day  metoprolol tartrate 25 mg oral tablet: 1 tab(s) orally once a day  montelukast 10 mg oral tablet: 1 tab(s) orally once a day (at bedtime)   apixaban 5 mg oral tablet: 1 tab(s) orally 2 times a day  atorvastatin 40 mg oral tablet: 1 tab(s) orally once a day (at bedtime)  clopidogrel 75 mg oral tablet: 1 tab(s) orally once a day  fluticasone 50 mcg/inh nasal spray: 2 spray(s) in each nostril once a day  furosemide 20 mg oral tablet: 1 tab(s) orally once a day  metoprolol tartrate 25 mg oral tablet: 1 tab(s) orally once a day  montelukast 10 mg oral tablet: 1 tab(s) orally once a day (at bedtime)  tamsulosin 0.4 mg oral capsule: 1 cap(s) orally once a day (at bedtime)   apixaban 5 mg oral tablet: 1 tab(s) orally 2 times a day  atorvastatin 40 mg oral tablet: 1 tab(s) orally once a day (at bedtime)  clopidogrel 75 mg oral tablet: 1 tab(s) orally once a day  fluticasone 50 mcg/inh nasal spray: 2 spray(s) in each nostril once a day  furosemide 20 mg oral tablet: 1 tab(s) orally once a day  metoprolol succinate 25 mg oral tablet, extended release: 1 tab(s) orally once a day  montelukast 10 mg oral tablet: 1 tab(s) orally once a day (at bedtime)  tamsulosin 0.4 mg oral capsule: 1 cap(s) orally once a day (at bedtime)

## 2023-09-23 DIAGNOSIS — R55 SYNCOPE AND COLLAPSE: ICD-10-CM

## 2023-09-23 DIAGNOSIS — I25.10 ATHEROSCLEROTIC HEART DISEASE OF NATIVE CORONARY ARTERY WITHOUT ANGINA PECTORIS: ICD-10-CM

## 2023-09-23 LAB
ANION GAP SERPL CALC-SCNC: 9 MMOL/L — SIGNIFICANT CHANGE UP (ref 7–14)
BASOPHILS # BLD AUTO: 0.02 K/UL — SIGNIFICANT CHANGE UP (ref 0–0.2)
BASOPHILS NFR BLD AUTO: 0.4 % — SIGNIFICANT CHANGE UP (ref 0–2)
BUN SERPL-MCNC: 32 MG/DL — HIGH (ref 7–23)
CALCIUM SERPL-MCNC: 8.2 MG/DL — LOW (ref 8.4–10.5)
CHLORIDE SERPL-SCNC: 108 MMOL/L — HIGH (ref 98–107)
CK SERPL-CCNC: 1104 U/L — HIGH (ref 30–200)
CO2 SERPL-SCNC: 22 MMOL/L — SIGNIFICANT CHANGE UP (ref 22–31)
CREAT SERPL-MCNC: 1.14 MG/DL — SIGNIFICANT CHANGE UP (ref 0.5–1.3)
EGFR: 62 ML/MIN/1.73M2 — SIGNIFICANT CHANGE UP
EOSINOPHIL # BLD AUTO: 0.11 K/UL — SIGNIFICANT CHANGE UP (ref 0–0.5)
EOSINOPHIL NFR BLD AUTO: 2.1 % — SIGNIFICANT CHANGE UP (ref 0–6)
GLUCOSE SERPL-MCNC: 109 MG/DL — HIGH (ref 70–99)
HCT VFR BLD CALC: 35 % — LOW (ref 39–50)
HGB BLD-MCNC: 11.6 G/DL — LOW (ref 13–17)
IANC: 2.59 K/UL — SIGNIFICANT CHANGE UP (ref 1.8–7.4)
IMM GRANULOCYTES NFR BLD AUTO: 0.4 % — SIGNIFICANT CHANGE UP (ref 0–0.9)
LYMPHOCYTES # BLD AUTO: 1.94 K/UL — SIGNIFICANT CHANGE UP (ref 1–3.3)
LYMPHOCYTES # BLD AUTO: 37.2 % — SIGNIFICANT CHANGE UP (ref 13–44)
MAGNESIUM SERPL-MCNC: 2.1 MG/DL — SIGNIFICANT CHANGE UP (ref 1.6–2.6)
MCHC RBC-ENTMCNC: 30.9 PG — SIGNIFICANT CHANGE UP (ref 27–34)
MCHC RBC-ENTMCNC: 33.1 GM/DL — SIGNIFICANT CHANGE UP (ref 32–36)
MCV RBC AUTO: 93.3 FL — SIGNIFICANT CHANGE UP (ref 80–100)
MONOCYTES # BLD AUTO: 0.54 K/UL — SIGNIFICANT CHANGE UP (ref 0–0.9)
MONOCYTES NFR BLD AUTO: 10.3 % — SIGNIFICANT CHANGE UP (ref 2–14)
NEUTROPHILS # BLD AUTO: 2.59 K/UL — SIGNIFICANT CHANGE UP (ref 1.8–7.4)
NEUTROPHILS NFR BLD AUTO: 49.6 % — SIGNIFICANT CHANGE UP (ref 43–77)
NRBC # BLD: 0 /100 WBCS — SIGNIFICANT CHANGE UP (ref 0–0)
NRBC # FLD: 0 K/UL — SIGNIFICANT CHANGE UP (ref 0–0)
PHOSPHATE SERPL-MCNC: 3.3 MG/DL — SIGNIFICANT CHANGE UP (ref 2.5–4.5)
PLATELET # BLD AUTO: 143 K/UL — LOW (ref 150–400)
POTASSIUM SERPL-MCNC: 4 MMOL/L — SIGNIFICANT CHANGE UP (ref 3.5–5.3)
POTASSIUM SERPL-SCNC: 4 MMOL/L — SIGNIFICANT CHANGE UP (ref 3.5–5.3)
RBC # BLD: 3.75 M/UL — LOW (ref 4.2–5.8)
RBC # FLD: 13.7 % — SIGNIFICANT CHANGE UP (ref 10.3–14.5)
SODIUM SERPL-SCNC: 139 MMOL/L — SIGNIFICANT CHANGE UP (ref 135–145)
WBC # BLD: 5.22 K/UL — SIGNIFICANT CHANGE UP (ref 3.8–10.5)
WBC # FLD AUTO: 5.22 K/UL — SIGNIFICANT CHANGE UP (ref 3.8–10.5)

## 2023-09-23 PROCEDURE — 99232 SBSQ HOSP IP/OBS MODERATE 35: CPT

## 2023-09-23 RX ADMIN — TAMSULOSIN HYDROCHLORIDE 0.4 MILLIGRAM(S): 0.4 CAPSULE ORAL at 22:18

## 2023-09-23 RX ADMIN — APIXABAN 5 MILLIGRAM(S): 2.5 TABLET, FILM COATED ORAL at 05:22

## 2023-09-23 RX ADMIN — Medication 25 MILLIGRAM(S): at 05:22

## 2023-09-23 RX ADMIN — APIXABAN 5 MILLIGRAM(S): 2.5 TABLET, FILM COATED ORAL at 17:09

## 2023-09-23 RX ADMIN — CLOPIDOGREL BISULFATE 75 MILLIGRAM(S): 75 TABLET, FILM COATED ORAL at 12:01

## 2023-09-23 NOTE — PROGRESS NOTE ADULT - PROBLEM SELECTOR PLAN 1
AFib on admission, new onset   [id number]  -EKG: a fib w/ R bundle branch block   - C/W Apixaban 5mg BID   - C/W Metoprolol succinate ER 25mg QD  - C/W Tele monitoring

## 2023-09-23 NOTE — PROGRESS NOTE ADULT - SUBJECTIVE AND OBJECTIVE BOX
PROGRESS NOTE:     Patient is a 88y old  Male who presents with a chief complaint of Weakness (22 Sep 2023 17:48)      SUBJECTIVE / OVERNIGHT EVENTS: No acute events.     ADDITIONAL REVIEW OF SYSTEMS:    MEDICATIONS  (STANDING):  apixaban 5 milliGRAM(s) Oral two times a day  clopidogrel Tablet 75 milliGRAM(s) Oral daily  lactated ringers. 1000 milliLiter(s) (100 mL/Hr) IV Continuous <Continuous>  metoprolol succinate ER 25 milliGRAM(s) Oral daily  tamsulosin 0.4 milliGRAM(s) Oral at bedtime    MEDICATIONS  (PRN):      CAPILLARY BLOOD GLUCOSE        I&O's Summary    22 Sep 2023 07:01  -  23 Sep 2023 07:00  --------------------------------------------------------  IN: 200 mL / OUT: 200 mL / NET: 0 mL        PHYSICAL EXAM:  Vital Signs Last 24 Hrs  T(C): 36.6 (23 Sep 2023 05:22), Max: 36.7 (22 Sep 2023 21:56)  T(F): 97.9 (23 Sep 2023 05:22), Max: 98.1 (22 Sep 2023 21:56)  HR: 86 (23 Sep 2023 05:22) (79 - 86)  BP: 126/60 (23 Sep 2023 05:22) (107/79 - 126/60)  BP(mean): --  RR: 19 (23 Sep 2023 05:22) (18 - 19)  SpO2: 98% (23 Sep 2023 05:22) (98% - 99%)    Parameters below as of 23 Sep 2023 05:22  Patient On (Oxygen Delivery Method): room air        CONSTITUTIONAL: NAD, well-developed  RESPIRATORY: Normal respiratory effort; lungs are clear to auscultation bilaterally  CARDIOVASCULAR: Regular rate and rhythm, normal S1 and S2, no murmur/rub/gallop; Mild lower extremity edema; Peripheral pulses are 2+ bilaterally  ABDOMEN: Nontender to palpation, normoactive bowel sounds, no rebound/guarding; No hepatosplenomegaly  MUSCLOSKELETAL: no clubbing or cyanosis of digits; no joint swelling or tenderness to palpation  PSYCH: A+O to person, place, and time; affect appropriate  NEURO: Moves all four extremities spontaneously; mild pill-rolling tremor throughout      LABS:                        11.6   5.22  )-----------( 143      ( 23 Sep 2023 07:00 )             35.0     09-23    139  |  108<H>  |  32<H>  ----------------------------<  109<H>  4.0   |  22  |  1.14    Ca    8.2<L>      23 Sep 2023 07:00  Phos  3.3     09-23  Mg     2.10     09-23        CARDIAC MARKERS ( 23 Sep 2023 07:00 )  x     / x     / 1104 U/L / x     / x      CARDIAC MARKERS ( 22 Sep 2023 06:00 )  x     / x     / 1880 U/L / x     / x          Urinalysis Basic - ( 23 Sep 2023 07:00 )    Color: x / Appearance: x / SG: x / pH: x  Gluc: 109 mg/dL / Ketone: x  / Bili: x / Urobili: x   Blood: x / Protein: x / Nitrite: x   Leuk Esterase: x / RBC: x / WBC x   Sq Epi: x / Non Sq Epi: x / Bacteria: x          RADIOLOGY & ADDITIONAL TESTS:  Results Reviewed:   Imaging Personally Reviewed:  Electrocardiogram Personally Reviewed:    COORDINATION OF CARE:  Care Discussed with Consultants/Other Providers [Y/N]:  Prior or Outpatient Records Reviewed [Y/N]:

## 2023-09-23 NOTE — PROGRESS NOTE ADULT - PROBLEM SELECTOR PLAN 2
-Acute HFrEF  -TTE with EF 40-45% and apical WMA. Poorly visualized Aortic Valve but findings concerning for significant AS   -Continue Toprol ER 25mg PO QD  -Holding home Furosemide 20mg PO QD  -ROHITH per Cards for AS eval early next week   -Cardiology f/up

## 2023-09-23 NOTE — PROGRESS NOTE ADULT - PROBLEM SELECTOR PLAN 6
Trop elevated but flat. ECG non-ishemic (no prior to compare). Unclear if related to elevated creatinine vs supply-demand mismatch ico COVID-19 & ?New afib. Unlikely ACS.  - Trop 132->133 flat  - ECG's nonischemic  - Tele monitoring

## 2023-09-23 NOTE — PROGRESS NOTE ADULT - PROBLEM SELECTOR PLAN 3
Suspect syncope from covid vs AS  -Trop 132->133   -telemetry monitoring   -TTE as above   -plan for ROHITH next week

## 2023-09-24 LAB
ANION GAP SERPL CALC-SCNC: 16 MMOL/L — HIGH (ref 7–14)
BASOPHILS # BLD AUTO: 0.01 K/UL — SIGNIFICANT CHANGE UP (ref 0–0.2)
BASOPHILS NFR BLD AUTO: 0.2 % — SIGNIFICANT CHANGE UP (ref 0–2)
BUN SERPL-MCNC: 24 MG/DL — HIGH (ref 7–23)
CALCIUM SERPL-MCNC: 8.4 MG/DL — SIGNIFICANT CHANGE UP (ref 8.4–10.5)
CHLORIDE SERPL-SCNC: 106 MMOL/L — SIGNIFICANT CHANGE UP (ref 98–107)
CO2 SERPL-SCNC: 22 MMOL/L — SIGNIFICANT CHANGE UP (ref 22–31)
CREAT SERPL-MCNC: 1.11 MG/DL — SIGNIFICANT CHANGE UP (ref 0.5–1.3)
EGFR: 64 ML/MIN/1.73M2 — SIGNIFICANT CHANGE UP
EOSINOPHIL # BLD AUTO: 0.13 K/UL — SIGNIFICANT CHANGE UP (ref 0–0.5)
EOSINOPHIL NFR BLD AUTO: 2.1 % — SIGNIFICANT CHANGE UP (ref 0–6)
GLUCOSE SERPL-MCNC: 114 MG/DL — HIGH (ref 70–99)
HCT VFR BLD CALC: 36 % — LOW (ref 39–50)
HGB BLD-MCNC: 12.2 G/DL — LOW (ref 13–17)
IANC: 3.66 K/UL — SIGNIFICANT CHANGE UP (ref 1.8–7.4)
IMM GRANULOCYTES NFR BLD AUTO: 0.5 % — SIGNIFICANT CHANGE UP (ref 0–0.9)
LYMPHOCYTES # BLD AUTO: 1.74 K/UL — SIGNIFICANT CHANGE UP (ref 1–3.3)
LYMPHOCYTES # BLD AUTO: 28.1 % — SIGNIFICANT CHANGE UP (ref 13–44)
MAGNESIUM SERPL-MCNC: 2 MG/DL — SIGNIFICANT CHANGE UP (ref 1.6–2.6)
MCHC RBC-ENTMCNC: 30.7 PG — SIGNIFICANT CHANGE UP (ref 27–34)
MCHC RBC-ENTMCNC: 33.9 GM/DL — SIGNIFICANT CHANGE UP (ref 32–36)
MCV RBC AUTO: 90.7 FL — SIGNIFICANT CHANGE UP (ref 80–100)
MONOCYTES # BLD AUTO: 0.63 K/UL — SIGNIFICANT CHANGE UP (ref 0–0.9)
MONOCYTES NFR BLD AUTO: 10.2 % — SIGNIFICANT CHANGE UP (ref 2–14)
NEUTROPHILS # BLD AUTO: 3.66 K/UL — SIGNIFICANT CHANGE UP (ref 1.8–7.4)
NEUTROPHILS NFR BLD AUTO: 58.9 % — SIGNIFICANT CHANGE UP (ref 43–77)
NRBC # BLD: 0 /100 WBCS — SIGNIFICANT CHANGE UP (ref 0–0)
NRBC # FLD: 0 K/UL — SIGNIFICANT CHANGE UP (ref 0–0)
PHOSPHATE SERPL-MCNC: 3.2 MG/DL — SIGNIFICANT CHANGE UP (ref 2.5–4.5)
PLATELET # BLD AUTO: 148 K/UL — LOW (ref 150–400)
POTASSIUM SERPL-MCNC: 4.3 MMOL/L — SIGNIFICANT CHANGE UP (ref 3.5–5.3)
POTASSIUM SERPL-SCNC: 4.3 MMOL/L — SIGNIFICANT CHANGE UP (ref 3.5–5.3)
RBC # BLD: 3.97 M/UL — LOW (ref 4.2–5.8)
RBC # FLD: 13.6 % — SIGNIFICANT CHANGE UP (ref 10.3–14.5)
SODIUM SERPL-SCNC: 144 MMOL/L — SIGNIFICANT CHANGE UP (ref 135–145)
WBC # BLD: 6.2 K/UL — SIGNIFICANT CHANGE UP (ref 3.8–10.5)
WBC # FLD AUTO: 6.2 K/UL — SIGNIFICANT CHANGE UP (ref 3.8–10.5)

## 2023-09-24 PROCEDURE — 99232 SBSQ HOSP IP/OBS MODERATE 35: CPT

## 2023-09-24 RX ADMIN — APIXABAN 5 MILLIGRAM(S): 2.5 TABLET, FILM COATED ORAL at 17:48

## 2023-09-24 RX ADMIN — Medication 25 MILLIGRAM(S): at 06:53

## 2023-09-24 RX ADMIN — CLOPIDOGREL BISULFATE 75 MILLIGRAM(S): 75 TABLET, FILM COATED ORAL at 09:51

## 2023-09-24 RX ADMIN — TAMSULOSIN HYDROCHLORIDE 0.4 MILLIGRAM(S): 0.4 CAPSULE ORAL at 23:16

## 2023-09-24 RX ADMIN — APIXABAN 5 MILLIGRAM(S): 2.5 TABLET, FILM COATED ORAL at 05:40

## 2023-09-24 NOTE — PROGRESS NOTE ADULT - PROBLEM SELECTOR PLAN 1
AFib on admission, new onset   [id number]  -EKG: a fib w/ R bundle branch block   - C/W Apixaban 5mg BID   - C/W Metoprolol succinate ER 25mg QD  - C/W Tele monitoring

## 2023-09-24 NOTE — PROGRESS NOTE ADULT - PROBLEM SELECTOR PLAN 12
Pt with h/o BPH, on Silodosin. Tadalafil/Sildenafil?   - D/C Tadalafil, Sildenafil on DC  - C/W Tamsulosni 0.4mg PO QHS Pt with h/o BPH, on Silodosin. Tadalafil/Sildenafil?   - D/C Tadalafil, Sildenafil on DC  - C/W Tamsulosin 0.4mg PO QHS

## 2023-09-24 NOTE — PROGRESS NOTE ADULT - SUBJECTIVE AND OBJECTIVE BOX
PROGRESS NOTE:     Patient is a 88y old  Male who presents with a chief complaint of Weakness (23 Sep 2023 13:04)      SUBJECTIVE / OVERNIGHT EVENTS: No acute events.     ADDITIONAL REVIEW OF SYSTEMS:    MEDICATIONS  (STANDING):  apixaban 5 milliGRAM(s) Oral two times a day  clopidogrel Tablet 75 milliGRAM(s) Oral daily  lactated ringers. 1000 milliLiter(s) (100 mL/Hr) IV Continuous <Continuous>  metoprolol succinate ER 25 milliGRAM(s) Oral daily  tamsulosin 0.4 milliGRAM(s) Oral at bedtime    MEDICATIONS  (PRN):      CAPILLARY BLOOD GLUCOSE        I&O's Summary      PHYSICAL EXAM:  Vital Signs Last 24 Hrs  T(C): 36.3 (24 Sep 2023 11:27), Max: 36.9 (23 Sep 2023 21:32)  T(F): 97.4 (24 Sep 2023 11:27), Max: 98.5 (23 Sep 2023 21:32)  HR: 79 (24 Sep 2023 11:27) (74 - 87)  BP: 143/85 (24 Sep 2023 11:27) (130/76 - 143/85)  BP(mean): --  RR: 18 (24 Sep 2023 11:27) (18 - 18)  SpO2: 98% (24 Sep 2023 11:27) (95% - 98%)    Parameters below as of 24 Sep 2023 05:40  Patient On (Oxygen Delivery Method): room air        CONSTITUTIONAL: NAD, well-developed  RESPIRATORY: Normal respiratory effort; lungs are clear to auscultation bilaterally  CARDIOVASCULAR: Regular rate and rhythm, normal S1 and S2, no murmur/rub/gallop; Mild lower extremity edema; Peripheral pulses are 2+ bilaterally  ABDOMEN: Nontender to palpation, normoactive bowel sounds, no rebound/guarding; No hepatosplenomegaly  MUSCLOSKELETAL: no clubbing or cyanosis of digits; no joint swelling or tenderness to palpation  PSYCH: A+O to person, place, and time; affect appropriate  NEURO: Moves all four extremities spontaneously; mild pill-rolling tremor throughout      LABS:                        12.2   6.20  )-----------( 148      ( 24 Sep 2023 06:30 )             36.0     09-24    144  |  106  |  24<H>  ----------------------------<  114<H>  4.3   |  22  |  1.11    Ca    8.4      24 Sep 2023 06:30  Phos  3.2     09-24  Mg     2.00     09-24        CARDIAC MARKERS ( 23 Sep 2023 07:00 )  x     / x     / 1104 U/L / x     / x          Urinalysis Basic - ( 24 Sep 2023 06:30 )    Color: x / Appearance: x / SG: x / pH: x  Gluc: 114 mg/dL / Ketone: x  / Bili: x / Urobili: x   Blood: x / Protein: x / Nitrite: x   Leuk Esterase: x / RBC: x / WBC x   Sq Epi: x / Non Sq Epi: x / Bacteria: x          RADIOLOGY & ADDITIONAL TESTS:  Results Reviewed:   Imaging Personally Reviewed:  Electrocardiogram Personally Reviewed:    COORDINATION OF CARE:  Care Discussed with Consultants/Other Providers [Y/N]:  Prior or Outpatient Records Reviewed [Y/N]:

## 2023-09-25 ENCOUNTER — TRANSCRIPTION ENCOUNTER (OUTPATIENT)
Age: 88
End: 2023-09-25

## 2023-09-25 VITALS
HEART RATE: 70 BPM | TEMPERATURE: 98 F | OXYGEN SATURATION: 96 % | DIASTOLIC BLOOD PRESSURE: 56 MMHG | SYSTOLIC BLOOD PRESSURE: 123 MMHG | RESPIRATION RATE: 18 BRPM

## 2023-09-25 LAB
ANION GAP SERPL CALC-SCNC: 16 MMOL/L — HIGH (ref 7–14)
APTT BLD: 40.4 SEC — HIGH (ref 24.5–35.6)
BASOPHILS # BLD AUTO: 0.02 K/UL — SIGNIFICANT CHANGE UP (ref 0–0.2)
BASOPHILS NFR BLD AUTO: 0.3 % — SIGNIFICANT CHANGE UP (ref 0–2)
BLD GP AB SCN SERPL QL: NEGATIVE — SIGNIFICANT CHANGE UP
BUN SERPL-MCNC: 22 MG/DL — SIGNIFICANT CHANGE UP (ref 7–23)
CALCIUM SERPL-MCNC: 8.6 MG/DL — SIGNIFICANT CHANGE UP (ref 8.4–10.5)
CHLORIDE SERPL-SCNC: 103 MMOL/L — SIGNIFICANT CHANGE UP (ref 98–107)
CK SERPL-CCNC: 416 U/L — HIGH (ref 30–200)
CO2 SERPL-SCNC: 21 MMOL/L — LOW (ref 22–31)
CREAT SERPL-MCNC: 1.01 MG/DL — SIGNIFICANT CHANGE UP (ref 0.5–1.3)
EGFR: 72 ML/MIN/1.73M2 — SIGNIFICANT CHANGE UP
EOSINOPHIL # BLD AUTO: 0.12 K/UL — SIGNIFICANT CHANGE UP (ref 0–0.5)
EOSINOPHIL NFR BLD AUTO: 1.7 % — SIGNIFICANT CHANGE UP (ref 0–6)
GLUCOSE SERPL-MCNC: 110 MG/DL — HIGH (ref 70–99)
HCT VFR BLD CALC: 38.5 % — LOW (ref 39–50)
HGB BLD-MCNC: 12.8 G/DL — LOW (ref 13–17)
IANC: 4.27 K/UL — SIGNIFICANT CHANGE UP (ref 1.8–7.4)
IMM GRANULOCYTES NFR BLD AUTO: 0.4 % — SIGNIFICANT CHANGE UP (ref 0–0.9)
INR BLD: 2.43 RATIO — HIGH (ref 0.85–1.18)
LYMPHOCYTES # BLD AUTO: 1.78 K/UL — SIGNIFICANT CHANGE UP (ref 1–3.3)
LYMPHOCYTES # BLD AUTO: 25.6 % — SIGNIFICANT CHANGE UP (ref 13–44)
MAGNESIUM SERPL-MCNC: 2 MG/DL — SIGNIFICANT CHANGE UP (ref 1.6–2.6)
MCHC RBC-ENTMCNC: 30.5 PG — SIGNIFICANT CHANGE UP (ref 27–34)
MCHC RBC-ENTMCNC: 33.2 GM/DL — SIGNIFICANT CHANGE UP (ref 32–36)
MCV RBC AUTO: 91.9 FL — SIGNIFICANT CHANGE UP (ref 80–100)
MONOCYTES # BLD AUTO: 0.72 K/UL — SIGNIFICANT CHANGE UP (ref 0–0.9)
MONOCYTES NFR BLD AUTO: 10.4 % — SIGNIFICANT CHANGE UP (ref 2–14)
NEUTROPHILS # BLD AUTO: 4.27 K/UL — SIGNIFICANT CHANGE UP (ref 1.8–7.4)
NEUTROPHILS NFR BLD AUTO: 61.6 % — SIGNIFICANT CHANGE UP (ref 43–77)
NRBC # BLD: 0 /100 WBCS — SIGNIFICANT CHANGE UP (ref 0–0)
NRBC # FLD: 0 K/UL — SIGNIFICANT CHANGE UP (ref 0–0)
PHOSPHATE SERPL-MCNC: 2.9 MG/DL — SIGNIFICANT CHANGE UP (ref 2.5–4.5)
PLATELET # BLD AUTO: 180 K/UL — SIGNIFICANT CHANGE UP (ref 150–400)
POTASSIUM SERPL-MCNC: 4.1 MMOL/L — SIGNIFICANT CHANGE UP (ref 3.5–5.3)
POTASSIUM SERPL-SCNC: 4.1 MMOL/L — SIGNIFICANT CHANGE UP (ref 3.5–5.3)
PROTHROM AB SERPL-ACNC: 26.8 SEC — HIGH (ref 9.5–13)
RBC # BLD: 4.19 M/UL — LOW (ref 4.2–5.8)
RBC # FLD: 13.4 % — SIGNIFICANT CHANGE UP (ref 10.3–14.5)
RH IG SCN BLD-IMP: NEGATIVE — SIGNIFICANT CHANGE UP
SODIUM SERPL-SCNC: 140 MMOL/L — SIGNIFICANT CHANGE UP (ref 135–145)
WBC # BLD: 6.94 K/UL — SIGNIFICANT CHANGE UP (ref 3.8–10.5)
WBC # FLD AUTO: 6.94 K/UL — SIGNIFICANT CHANGE UP (ref 3.8–10.5)

## 2023-09-25 PROCEDURE — 99232 SBSQ HOSP IP/OBS MODERATE 35: CPT | Mod: GC

## 2023-09-25 PROCEDURE — 99239 HOSP IP/OBS DSCHRG MGMT >30: CPT

## 2023-09-25 RX ORDER — CILOSTAZOL 100 MG/1
1 TABLET ORAL
Refills: 0 | DISCHARGE

## 2023-09-25 RX ORDER — METOPROLOL TARTRATE 50 MG
1 TABLET ORAL
Refills: 0 | DISCHARGE

## 2023-09-25 RX ORDER — APIXABAN 2.5 MG/1
1 TABLET, FILM COATED ORAL
Qty: 60 | Refills: 0
Start: 2023-09-25 | End: 2023-10-24

## 2023-09-25 RX ORDER — TADALAFIL 10 MG/1
1 TABLET, FILM COATED ORAL
Refills: 0 | DISCHARGE

## 2023-09-25 RX ORDER — METOPROLOL TARTRATE 50 MG
1 TABLET ORAL
Qty: 30 | Refills: 0
Start: 2023-09-25 | End: 2023-10-24

## 2023-09-25 RX ORDER — SILODOSIN 4 MG/1
1 CAPSULE ORAL
Refills: 0 | DISCHARGE

## 2023-09-25 RX ORDER — TAMSULOSIN HYDROCHLORIDE 0.4 MG/1
1 CAPSULE ORAL
Qty: 30 | Refills: 0
Start: 2023-09-25 | End: 2023-10-24

## 2023-09-25 RX ORDER — ASPIRIN/CALCIUM CARB/MAGNESIUM 324 MG
1 TABLET ORAL
Refills: 0 | DISCHARGE

## 2023-09-25 RX ADMIN — APIXABAN 5 MILLIGRAM(S): 2.5 TABLET, FILM COATED ORAL at 06:05

## 2023-09-25 RX ADMIN — Medication 25 MILLIGRAM(S): at 06:05

## 2023-09-25 NOTE — PROGRESS NOTE ADULT - PROBLEM SELECTOR PLAN 3
Suspect syncope from covid and less likely AS  -Trop 132->133   -telemetry monitoring   -TTE as above   -defer ROHITH at this time d/t COVID+

## 2023-09-25 NOTE — PROGRESS NOTE ADULT - PROBLEM SELECTOR PLAN 1
AFib on admission, new onset   [id number]  -EKG: a fib w/ R bundle branch block   - C/W Apixaban 5mg BID   - C/W Metoprolol succinate ER 25mg QD  - C/W Tele monitoring

## 2023-09-25 NOTE — PROGRESS NOTE ADULT - ATTENDING COMMENTS
The patient was seen and examined with the Cardiology Consultation Teaching Service.     No overnight events    No chest pain  No dyspnea, orthopnea or PND  No palpitations or dizziness     Continued cough    PMH/PSH:  Coronary artery disease    Coronary artery bypass grafting    Myocardial infarction, 2021  Stroke without residual deficit  Atrial fibrillation  Hypertension  Parkinson's disease    Comfortable-appearing man in no acute distress  Alert and oriented  Afebrile  Vital signs stable  JVP is not elevated  Clear lungs  2/6 crescendo-decrescendo murmur, not particularly late-peaking  Clear second heart sound  Extremities are warm and perfused  No peripheral edema    Stable normocytic anemia Hb 12.8  INR 2.4  GFR 72    hs-troponin 133, 122  CK 3130 peak  Normal CK-MB and Index    Echocardiography demonstrates moderately reduced LV function with hypokinesis in the apical and apical septal walls, LVEF 40-45%, moderate MR, calcification of the aortic valve, mean gradient 18, probably significant aortic stenosis    Impression and Recommendations:  88-year-old man with coronary artery disease, CABG, myocardial infarction, and prior stroke who presented with generalized weakness and inability to walk in the context of Covid-19 infection. He was noted to have possible significant aortic stenosis on echocardiography.     The patient's presentation is not consistent with the presentation of symptomatic aortic stenosis. The patient did not have an episode of syncope, and it is difficult to assess whether the patient is experiencing dyspnea related to the valve in the context of his infection. He does not report progressive exertional dyspnea to me during my interview.     I would defer transesophageal echocardiography at present. It is not an ideal time to pursue this test during an infection, and there is no urgency to obtaining additional imaging of the aortic valve. This can be pursued as an outpatient if on recovery from his acute illness, there is concern that he remains symptomatic from possible valve disease.     Continue apixaban. The patient reports that he has had an irregular heart rhythm for some time and was found to be in atrial fibrillation. Continue clopidogrel for known coronary disease. The patient should be on a high-potency statin if his CK elevation has resolved. Continue metoprolol succinate. Consider RAAS inhibition for reduced LV function, though this can be revisited in the outpatient setting.    Please reconsult cardiology with additional questions or concerns.    Urbano Knapp MD  Cardiology  [phone]

## 2023-09-25 NOTE — PHARMACOTHERAPY INTERVENTION NOTE - COMMENTS
Discharge medications reviewed with the patient. Current medication schedule was discussed in detail including: medication name, indication, dose, administration times, treatment duration, side effects, and special instructions. Educated patient on apixaban including the purpose and administration. Discussed adverse effects in detail and when to seek medical attention (blood in stool, vomit, etc.). Informed patient to notify all providers he is on this and to avoid NSAIDs to limit bleeding. Patient reports he takes a lot of supplements, including glucosamine/chondroitin. Informed patient that this may increase his risk of bleeding, and to discuss continued use with his doctor. Questions and concerns were answered and addressed. Patient demonstrated understanding. Patient provided with educational medication cards.    New medications: apixaban, tamsulosin    Jyoti ClarkeD, BCPS  Clinical Pharmacy Specialist  [phone]

## 2023-09-25 NOTE — PROGRESS NOTE ADULT - PROBLEM SELECTOR PLAN 2
-Acute HFrEF  -TTE with EF 40-45% and apical WMA. Poorly visualized Aortic Valve but findings concerning for significant AS   -Continue Toprol ER 25mg PO QD  -Holding home Furosemide 20mg PO QD, can resume on discharge   -defer ROHITH at this time d/t COVID+   -Cardiology f/up

## 2023-09-25 NOTE — PROGRESS NOTE ADULT - SUBJECTIVE AND OBJECTIVE BOX
Patient seen and examined at bedside.    Overnight Events:   NAEO   SR with 1st AVB and PVCs on telemetry     Sx improving. Patient reports feeling back to baseline. His sx started 2-3 days prior to admission, which coincides with his COVID sx.     REVIEW OF SYSTEMS:  CONSTITUTIONAL: No weakness, fevers or chills  EYES/ENT: No visual changes;  No dysphagia  NECK: No pain or stiffness  RESPIRATORY: No cough, wheezing, hemoptysis; No shortness of breath  CARDIOVASCULAR: No chest pain or palpitations; No lower extremity edema  GASTROINTESTINAL: No abdominal or epigastric pain. No nausea, vomiting, or hematemesis; No diarrhea or constipation. No melena or hematochezia.  BACK: No back pain  GENITOURINARY: No dysuria, frequency or hematuria  NEUROLOGICAL: No numbness or weakness  SKIN: No itching, burning, rashes, or lesions   All other review of systems is negative unless indicated above.            Current Meds:  apixaban 5 milliGRAM(s) Oral two times a day  clopidogrel Tablet 75 milliGRAM(s) Oral daily  lactated ringers. 1000 milliLiter(s) IV Continuous <Continuous>  metoprolol succinate ER 25 milliGRAM(s) Oral daily  tamsulosin 0.4 milliGRAM(s) Oral at bedtime      Vitals:  T(F): 98.4 (09-25), Max: 98.4 (09-25)  HR: 70 (09-25) (70 - 77)  BP: 123/56 (09-25) (123/56 - 155/79)  RR: 18 (09-25)  SpO2: 96% (09-25)  I&O's Summary      Physical Exam:  Appearance: No acute distress; well appearing  Eyes: PERRL, EOMI, pink conjunctiva  HEENT: Normal oral mucosa  Cardiovascular: RRR, S1, S2, systolic murmur; no rubs, or gallops; no edema; no JVD  Respiratory: Clear to auscultation bilaterally  Gastrointestinal: soft, non-tender, non-distended with normal bowel sounds  Musculoskeletal: No clubbing; no joint deformity   Neurologic: Non-focal  Lymphatic: No lymphadenopathy  Psychiatry: AAOx3, mood & affect appropriate  Skin: No rashes, ecchymoses, or cyanosis                          12.8   6.94  )-----------( 180      ( 25 Sep 2023 07:50 )             38.5     09-25    140  |  103  |  22  ----------------------------<  110<H>  4.1   |  21<L>  |  1.01    Ca    8.6      25 Sep 2023 07:50  Phos  2.9     09-25  Mg     2.00     09-25      PT/INR - ( 25 Sep 2023 07:50 )   PT: 26.8 sec;   INR: 2.43 ratio         PTT - ( 25 Sep 2023 07:50 )  PTT:40.4 sec  CARDIAC MARKERS ( 25 Sep 2023 07:50 )  x     / x     / 416 U/L / x     / x                  New ECG(s): Personally reviewed

## 2023-09-25 NOTE — DISCHARGE NOTE NURSING/CASE MANAGEMENT/SOCIAL WORK - NSDCPEFALRISK_GEN_ALL_CORE
For information on Fall & Injury Prevention, visit: [email or web address] OR  [email or web address] OR  https://www.cdc.gov/steadi/patient.html

## 2023-09-25 NOTE — PROGRESS NOTE ADULT - PROBLEM SELECTOR PROBLEM 12
BPH (benign prostatic hyperplasia)
Prophylactic measure
Prophylactic measure

## 2023-09-25 NOTE — PROGRESS NOTE ADULT - PROBLEM SELECTOR PLAN 12
Pt with h/o BPH, on Silodosin. Tadalafil/Sildenafil?   - D/C Tadalafil, Sildenafil on DC  - C/W Tamsulosin 0.4mg PO QHS

## 2023-09-25 NOTE — PROGRESS NOTE ADULT - SUBJECTIVE AND OBJECTIVE BOX
PROGRESS NOTE:     Patient is a 88y old  Male who presents with a chief complaint of Weakness (24 Sep 2023 12:59)      SUBJECTIVE / OVERNIGHT EVENTS: No acute events.     ADDITIONAL REVIEW OF SYSTEMS:    MEDICATIONS  (STANDING):  apixaban 5 milliGRAM(s) Oral two times a day  clopidogrel Tablet 75 milliGRAM(s) Oral daily  lactated ringers. 1000 milliLiter(s) (100 mL/Hr) IV Continuous <Continuous>  metoprolol succinate ER 25 milliGRAM(s) Oral daily  tamsulosin 0.4 milliGRAM(s) Oral at bedtime    MEDICATIONS  (PRN):      CAPILLARY BLOOD GLUCOSE        I&O's Summary      PHYSICAL EXAM:  Vital Signs Last 24 Hrs  T(C): 36.9 (25 Sep 2023 12:00), Max: 36.9 (25 Sep 2023 12:00)  T(F): 98.4 (25 Sep 2023 12:00), Max: 98.4 (25 Sep 2023 12:00)  HR: 70 (25 Sep 2023 12:00) (70 - 77)  BP: 123/56 (25 Sep 2023 12:00) (123/56 - 155/79)  BP(mean): 70 (25 Sep 2023 12:00) (70 - 70)  RR: 18 (25 Sep 2023 12:00) (18 - 18)  SpO2: 96% (25 Sep 2023 12:00) (96% - 98%)    Parameters below as of 25 Sep 2023 12:00  Patient On (Oxygen Delivery Method): room air        CONSTITUTIONAL: NAD, well-developed  RESPIRATORY: Normal respiratory effort; lungs are clear to auscultation bilaterally  CARDIOVASCULAR: Regular rate and rhythm, normal S1 and S2, no murmur/rub/gallop; Mild lower extremity edema; Peripheral pulses are 2+ bilaterally  ABDOMEN: Nontender to palpation, normoactive bowel sounds, no rebound/guarding; No hepatosplenomegaly  MUSCLOSKELETAL: no clubbing or cyanosis of digits; no joint swelling or tenderness to palpation  PSYCH: A+O to person, place, and time; affect appropriate  NEURO: Moves all four extremities spontaneously; mild pill-rolling tremor throughout    LABS:                        12.8   6.94  )-----------( 180      ( 25 Sep 2023 07:50 )             38.5     09-25    140  |  103  |  22  ----------------------------<  110<H>  4.1   |  21<L>  |  1.01    Ca    8.6      25 Sep 2023 07:50  Phos  2.9     09-25  Mg     2.00     09-25      PT/INR - ( 25 Sep 2023 07:50 )   PT: 26.8 sec;   INR: 2.43 ratio         PTT - ( 25 Sep 2023 07:50 )  PTT:40.4 sec  CARDIAC MARKERS ( 25 Sep 2023 07:50 )  x     / x     / 416 U/L / x     / x          Urinalysis Basic - ( 25 Sep 2023 07:50 )    Color: x / Appearance: x / SG: x / pH: x  Gluc: 110 mg/dL / Ketone: x  / Bili: x / Urobili: x   Blood: x / Protein: x / Nitrite: x   Leuk Esterase: x / RBC: x / WBC x   Sq Epi: x / Non Sq Epi: x / Bacteria: x          RADIOLOGY & ADDITIONAL TESTS:  Results Reviewed:   Imaging Personally Reviewed:  Electrocardiogram Personally Reviewed:    COORDINATION OF CARE:  Care Discussed with Consultants/Other Providers [Y/N]:  Prior or Outpatient Records Reviewed [Y/N]:
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
## 2023-09-25 NOTE — PROGRESS NOTE ADULT - PROVIDER SPECIALTY LIST ADULT
Cardiology
Internal Medicine
Cardiology
Cardiology
Hospitalist
Hospitalist
Internal Medicine
Internal Medicine
Hospitalist

## 2023-09-25 NOTE — DISCHARGE NOTE NURSING/CASE MANAGEMENT/SOCIAL WORK - PATIENT PORTAL LINK FT
You can access the FollowMyHealth Patient Portal offered by [facility] by registering at the following website: [email or web address]. By joining [employer]’s FollowMyHealth portal, you will also be able to view your health information using other applications (apps) compatible with our system.